# Patient Record
Sex: MALE | Race: WHITE | NOT HISPANIC OR LATINO | Employment: OTHER | URBAN - METROPOLITAN AREA
[De-identification: names, ages, dates, MRNs, and addresses within clinical notes are randomized per-mention and may not be internally consistent; named-entity substitution may affect disease eponyms.]

---

## 2017-05-25 ENCOUNTER — TELEPHONE (OUTPATIENT)
Dept: NEUROLOGY | Facility: CLINIC | Age: 82
End: 2017-05-25

## 2017-05-25 NOTE — TELEPHONE ENCOUNTER
----- Message from Wilbert Keith sent at 5/23/2017  3:19 PM CDT -----  Contact: pt's wife Randa  Pt is calling to see if pt's referral was recvd for pt yet  Call Back#[phone]  Thanks

## 2017-05-25 NOTE — TELEPHONE ENCOUNTER
Appointment scheduled to see Dr. Alvarado 8-15-17, mailed and wife, Rebecca, indicated understanding.

## 2017-08-15 ENCOUNTER — LAB VISIT (OUTPATIENT)
Dept: LAB | Facility: HOSPITAL | Age: 82
End: 2017-08-15
Attending: PSYCHIATRY & NEUROLOGY
Payer: MEDICARE

## 2017-08-15 ENCOUNTER — OFFICE VISIT (OUTPATIENT)
Dept: NEUROLOGY | Facility: CLINIC | Age: 82
End: 2017-08-15
Payer: MEDICARE

## 2017-08-15 VITALS
BODY MASS INDEX: 20.8 KG/M2 | HEIGHT: 68 IN | WEIGHT: 137.25 LBS | HEART RATE: 50 BPM | DIASTOLIC BLOOD PRESSURE: 72 MMHG | SYSTOLIC BLOOD PRESSURE: 145 MMHG

## 2017-08-15 DIAGNOSIS — G47.52 REM SLEEP BEHAVIOR DISORDER: ICD-10-CM

## 2017-08-15 DIAGNOSIS — R29.6 FALLS FREQUENTLY: ICD-10-CM

## 2017-08-15 DIAGNOSIS — R41.3 MEMORY LOSS: ICD-10-CM

## 2017-08-15 DIAGNOSIS — R41.3 MEMORY LOSS: Primary | ICD-10-CM

## 2017-08-15 LAB
TSH SERPL DL<=0.005 MIU/L-ACNC: 2.24 UIU/ML
VIT B12 SERPL-MCNC: 536 PG/ML

## 2017-08-15 PROCEDURE — 99205 OFFICE O/P NEW HI 60 MIN: CPT | Mod: S$GLB,,, | Performed by: PSYCHIATRY & NEUROLOGY

## 2017-08-15 PROCEDURE — 1126F AMNT PAIN NOTED NONE PRSNT: CPT | Mod: S$GLB,,, | Performed by: PSYCHIATRY & NEUROLOGY

## 2017-08-15 PROCEDURE — 1159F MED LIST DOCD IN RCRD: CPT | Mod: S$GLB,,, | Performed by: PSYCHIATRY & NEUROLOGY

## 2017-08-15 PROCEDURE — 82607 VITAMIN B-12: CPT

## 2017-08-15 PROCEDURE — 99999 PR PBB SHADOW E&M-EST. PATIENT-LVL III: CPT | Mod: PBBFAC,,, | Performed by: PSYCHIATRY & NEUROLOGY

## 2017-08-15 PROCEDURE — 3008F BODY MASS INDEX DOCD: CPT | Mod: S$GLB,,, | Performed by: PSYCHIATRY & NEUROLOGY

## 2017-08-15 PROCEDURE — 84443 ASSAY THYROID STIM HORMONE: CPT

## 2017-08-15 PROCEDURE — 36415 COLL VENOUS BLD VENIPUNCTURE: CPT

## 2017-08-15 RX ORDER — ESCITALOPRAM OXALATE 10 MG/1
10 TABLET ORAL DAILY
COMMUNITY
End: 2019-09-10 | Stop reason: SDUPTHER

## 2017-08-15 RX ORDER — DONEPEZIL HYDROCHLORIDE 10 MG/1
10 TABLET, FILM COATED ORAL NIGHTLY
Qty: 90 TABLET | Refills: 3 | Status: SHIPPED | OUTPATIENT
Start: 2017-08-15 | End: 2018-10-31 | Stop reason: SDUPTHER

## 2017-08-15 RX ORDER — LOVASTATIN 40 MG/1
40 TABLET ORAL NIGHTLY
COMMUNITY
End: 2019-09-10 | Stop reason: SDUPTHER

## 2017-08-15 RX ORDER — DONEPEZIL HYDROCHLORIDE 5 MG/1
5 TABLET, FILM COATED ORAL NIGHTLY
COMMUNITY
End: 2017-08-15 | Stop reason: SDUPTHER

## 2017-08-15 RX ORDER — MULTIVITAMIN
1 TABLET ORAL DAILY
Status: ON HOLD | COMMUNITY
End: 2021-08-23 | Stop reason: HOSPADM

## 2017-08-15 RX ORDER — ASPIRIN 325 MG
650 TABLET ORAL DAILY
Status: ON HOLD | COMMUNITY
End: 2021-08-23 | Stop reason: HOSPADM

## 2017-08-15 NOTE — LETTER
August 15, 2017      Tyrell Daniel MD  [address]  [address]  [facility] [place] [address]           [facility]  [address] [facility] [address] [place] [address]  Phone: [phone]  Fax: [phone]          Patient: Bruno Manjarrez   MR Number: [id number]   YOB: 1934   Date of Visit: 8/15/2017       Dear Dr. Tyrell Daniel:    Thank you for referring Bruno Manjarrez to me for evaluation. Attached you will find relevant portions of my assessment and plan of care.    If you have questions, please do not hesitate to call me. I look forward to following Bruno Manjarrez along with you.    Sincerely,    Benny Alvarado, DO    Enclosure  CC:  No Recipients    If you would like to receive this communication electronically, please contact [email or web address] or [phone] to request more information on [facility] Link access.    For providers and/or their staff who would like to refer a patient to [facility], please contact us through our one-stop-shop provider referral line, [facility], at [phone].    If you feel you have received this communication in error or would no longer like to receive these types of communications, please e-mail [email or web address]

## 2017-08-15 NOTE — PROGRESS NOTES
Subjective:      8/15/2017       Patient ID: Bruno Manjarrez is a right handed 82 y.o.  male who presents for memory and tremor.    History of Present Illness    Mr. Manjarrez is a very pleasant 82-year-old gentleman here with his wife.  Here to discuss tremor, active dreaming and nightmares, and memory loss.  Has been calling out and acting out his dreams over the past 20 years (actively fights with his brother for example) and has been sleeping in a different room for a long time, however this has become more pronounced recently.    He himself is aware that he has difficulty with memory but has limited insight and can not give details.  His wife states he mat for example go to the wrong pharmacy to  medicine, go shopping and forget groceries in the car.  Difficulty completing tasks he starts, may not complete a sentence.  Misplacing objects.     Although he was an , his wife took over the finances years ago primarily since he retired, not because she had to but my preference.  Recently however he stopped driving.     Falls: Has not passed out but has had to sit down after standing up, has had multiple falls from feeling dizzy.  Last fall 2 weeks ago.     Onset of memory issues in 2008, started getting lost in familiar places more recently, perhaps over past 2-3 years.      May have some visual hallucinations, has seen the dog whom they put to sleep from time to time in the house recently.  His wife was unaware this was happening.  He may have some other formed visual hallucinations but has not told anyone about them.    Review of patient's allergies indicates:  No Known Allergies  Current Outpatient Prescriptions   Medication Sig Dispense Refill    aspirin (ECOTRIN) 81 MG EC tablet Take 81 mg by mouth once daily.      donepezil (ARICEPT) 10 MG tablet Take 1 tablet (10 mg total) by mouth every evening. 90 tablet 3    escitalopram oxalate (LEXAPRO) 10 MG tablet Take 10 mg by mouth once daily.       lovastatin (MEVACOR) 40 MG tablet Take 40 mg by mouth every evening.      multivitamin (ONE DAILY MULTIVITAMIN) per tablet Take 1 tablet by mouth once daily.      polycarbophil (FIBERCON) 625 mg tablet Take 625 mg by mouth 4 (four) times daily as needed.      VIT C/E/ZN/COPPR/LUTEIN/ZEAXAN (PRESERVISION AREDS 2 ORAL) Take by mouth.       No current facility-administered medications for this visit.        Past Medical History  History reviewed. No pertinent past medical history.    Past Surgical History  History reviewed. No pertinent surgical history.    Family History  History reviewed. No pertinent family history.    Social History  Social History     Social History    Marital status:      Spouse name: N/A    Number of children: N/A    Years of education: N/A     Occupational History    Not on file.     Social History Main Topics    Smoking status: Never Smoker    Smokeless tobacco: Never Used    Alcohol use Not on file    Drug use: Unknown    Sexual activity: Not on file     Other Topics Concern    Not on file     Social History Narrative    No narrative on file        Review of Systems  Review of Systems   Constitutional: Positive for appetite change, fatigue and unexpected weight change (loss). Negative for fever.   HENT: Negative for congestion, hearing loss, nosebleeds, sinus pressure and trouble swallowing.    Eyes: Negative for pain and visual disturbance.   Respiratory: Negative for cough, shortness of breath and wheezing.    Cardiovascular: Negative for chest pain and palpitations.   Gastrointestinal: Negative for abdominal pain, blood in stool, diarrhea, nausea and vomiting.   Endocrine: Negative for cold intolerance and heat intolerance.   Genitourinary: Negative for difficulty urinating, hematuria and urgency.   Musculoskeletal: Negative for arthralgias, back pain, gait problem, myalgias and neck pain.   Skin: Negative for rash and wound.   Neurological: Positive for tremors and  "weakness. Negative for dizziness, seizures and numbness.   Hematological: Bruises/bleeds easily.   Psychiatric/Behavioral: Positive for dysphoric mood (depression). Negative for decreased concentration and sleep disturbance. The patient is nervous/anxious.        Objective:        Vitals:    08/15/17 0800   BP: (!) 145/72   Pulse: (!) 50     Body mass index is 20.87 kg/m².  Neurologic Exam     Mental Status   Oriented to person, place, and time.   Registration: recalls 3 of 3 objects. Recall at 5 minutes: recalls 2 of 3 objects.   [id number]==96, corrected to 80  WORLD-DLROW    Difficulty in comprehending written instruction (close your eyes), missed intersecting pentagons    Unable to draw clock - made a [place],  placed numbers in counter clockwise order (12,11,10, etc moving clockwise), circled 7,1,5 when asked to place hands at "7:15"    MMSE score 27       Cranial Nerves     CN II   Visual fields full to confrontation.     CN III, IV, VI   Pupils are equal, round, and reactive to light.  Extraocular motions are normal.   CN III: no CN III palsy  CN VI: no CN VI palsy    CN V   Facial sensation intact.     CN VII   Facial expression full, symmetric.     CN IX, X   Palate: symmetric    CN XI   CN XI normal.     CN XII   CN XII normal.   Full facial strength but decreased expressivity     Motor Exam   Muscle bulk: normal  Overall muscle tone: normal  Right arm pronator drift: absent  Left arm pronator drift: absent    Strength   Strength 5/5 throughout. bradykinesia     Sensory Exam   Light touch normal.     Gait, Coordination, and Reflexes     Gait  Gait: normal    Coordination   Romberg: negative  Finger to nose coordination: normal    Tremor   Resting tremor: present (diffuse tremulousness)    Reflexes   Right brachioradialis: 2+  Left brachioradialis: 2+  Right biceps: 3+  Left biceps: 3+  Right triceps: 2+  Left triceps: 2+  Right patellar: 3+  Left patellar: 2+  Right achilles: 2+  Left achilles: 2+Fine " tremulousness throughout symmetrically, in UE, face neck and trunk, primarily at rest.  No pill rolling.       Physical Exam   Constitutional: He is oriented to person, place, and time. He appears well-developed and well-nourished.   HENT:   Head: Normocephalic and atraumatic.   Eyes: EOM are normal. Pupils are equal, round, and reactive to light.   Cardiovascular: Normal rate and regular rhythm.    Pulmonary/Chest: Effort normal and breath sounds normal.   Neurological: He is oriented to person, place, and time. He has normal strength. He has a normal Finger-Nose-Finger Test and a normal Romberg Test. Gait normal.   Reflex Scores:       Tricep reflexes are 2+ on the right side and 2+ on the left side.       Bicep reflexes are 3+ on the right side and 3+ on the left side.       Brachioradialis reflexes are 2+ on the right side and 2+ on the left side.       Patellar reflexes are 3+ on the right side and 2+ on the left side.       Achilles reflexes are 2+ on the right side and 2+ on the left side.  Psychiatric: His mood appears anxious (mildly). His speech is delayed. He is slowed. He is not agitated.   Vitals reviewed.        Data Review:     No lab studies available.      MRI brain on disc [facility] reviewed. There is mild atrophy, maybe appropriate for age, and scattered mild chronic small vessel ischemic changes in the subcortical white matter        Assessment:       1. Memory loss    2. Falls frequently    3. REM sleep behavior disorder        Plan:         Problem List Items Addressed This Visit        Neuro    Memory loss - Primary    Current Assessment & Plan     Clinical features of memory loss and forgetfulness, visual spatial deficits on examination, and history of falls, visual hallucinations and REM sleep disordered behavior concerning for Lewy body dementia.  Explained to the patient and his wife that I can't confirm that diagnosis now, but would like to get detailed neuropsychological testing for  clinical correlation.    At this point time he is not driving.  Discussed falls precautions.  We will set up physical therapy for assisting with his gait stability.    Increase Aricept from 5-10 mg daily.         Relevant Orders    Vitamin B12    TSH    Ambulatory Referral to Neuropsychology       Pulmonary    REM sleep behavior disorder    Current Assessment & Plan     I will have to do some research to find out what may be beneficial.  Consider dedicated referral to sleep disorders clinic as this seems to precede his current diagnosis by many years            Other    Falls frequently    Current Assessment & Plan     As above         Relevant Orders    Ambulatory Referral to Physical/Occupational Therapy      Other Visit Diagnoses    None.         Return in about 2 months (around 10/15/2017).        Patient information shared at the time of visit:      Thank you very much for the opportunity to assist in this patient's care.  If you have any questions or concerns, please do not hesitate to contact me at any time.     Sincerely,  Benny Alvarado, DO

## 2017-08-15 NOTE — ASSESSMENT & PLAN NOTE
Clinical features of memory loss and forgetfulness, visual spatial deficits on examination, and history of falls, visual hallucinations and REM sleep disordered behavior concerning for Lewy body dementia.  Explained to the patient and his wife that I can't confirm that diagnosis now, but would like to get detailed neuropsychological testing for clinical correlation.    At this point time he is not driving.  Discussed falls precautions.  We will set up physical therapy for assisting with his gait stability.    Increase Aricept from 5-10 mg daily.

## 2017-08-15 NOTE — ASSESSMENT & PLAN NOTE
I will have to do some research to find out what may be beneficial.  Consider dedicated referral to sleep disorders clinic as this seems to precede his current diagnosis by many years

## 2017-08-17 ENCOUNTER — TELEPHONE (OUTPATIENT)
Dept: NEUROLOGY | Facility: CLINIC | Age: 82
End: 2017-08-17

## 2017-08-17 NOTE — TELEPHONE ENCOUNTER
----- Message from Margarita Wallace sent at 8/17/2017 12:58 PM CDT -----  Contact: Su from [facility]  Calling as needs clarification on Rx Donepezil    [facility] [address] [address] [place]  [address] [facility]  [address] [facility]  [place] [address]  Phone: [phone] Fax: [phone]

## 2017-08-17 NOTE — TELEPHONE ENCOUNTER
Spoke with the p[harmacy, verified the dose of Aricept was increased by Dr. Alvarado at most recent visit.

## 2017-08-18 ENCOUNTER — TELEPHONE (OUTPATIENT)
Dept: NEUROLOGY | Facility: CLINIC | Age: 82
End: 2017-08-18

## 2017-08-18 NOTE — TELEPHONE ENCOUNTER
Spoke to pt, informed that we have his disk from [facility] that needs to be returned.  States he does not come to [place] often and he will pick it up at his next appt in November.  Disk and envelope labeled with pt label.

## 2017-08-25 ENCOUNTER — CLINICAL SUPPORT (OUTPATIENT)
Dept: REHABILITATION | Facility: HOSPITAL | Age: 82
End: 2017-08-25
Attending: PSYCHIATRY & NEUROLOGY
Payer: MEDICARE

## 2017-08-25 DIAGNOSIS — R29.6 FALLS FREQUENTLY: Primary | ICD-10-CM

## 2017-08-25 PROCEDURE — G8978 MOBILITY CURRENT STATUS: HCPCS | Mod: CJ,PN

## 2017-08-25 PROCEDURE — 97162 PT EVAL MOD COMPLEX 30 MIN: CPT | Mod: PN

## 2017-08-25 PROCEDURE — G8979 MOBILITY GOAL STATUS: HCPCS | Mod: CI,PN

## 2017-08-25 NOTE — PLAN OF CARE
TIME RECORD    Date: 08/25/2017    Start Time:  1110  Stop Time:  1200    PROCEDURES:    TIMED  Procedure Time Min.    Start:  Stop:     Start:  Stop:     Start:  Stop:     Start:  Stop:          UNTIMED  Procedure Time Min.     Initial Evaluation Start:  1110  Stop:  1200   47    Start:  Stop:      Total Timed Minutes:  0  Total Timed Units:  0  Total Untimed Units:  1  Charges Billed/# of units:  1    OUTPATIENT PHYSICAL THERAPY   PATIENT EVALUATION    Onset Date: Chronic  Primary Diagnosis:   1. Falls frequently       Treatment Diagnosis: Balance disturbance  No past medical history on file.  Precautions: Fall risk  Prior Therapy: Following a hand surgery.    Medications: Bruno Manjarrez has a current medication list which includes the following prescription(s): aspirin, donepezil, escitalopram oxalate, lovastatin, multivitamin, polycarbophil, and vit c/e/zn/coppr/lutein/zeaxan.  Nutrition:  Normal  History of Present Illness: Pt presents to PT with history of falls.  He reports that this problems has been occurring for awhile.  Pt states that he averages ~ 2 falls a month.  It is episodic.  He reports intermittent dizziness but he states that the dizziness has not been associated with falling.  Falls occur with quick movements, changes in direction, etc.  He states that he can usually feel the dizziness coming on and can hold onto something or sit down until it passes (usually within a few minutes).  He has been diagnosed with macular degeneration which affects his vision that could contribute to tripping hazard.  Reports muscle weakness, particularly in his shoulders and knees.  States that sometimes it feels like his knees are giving out on him resulting in a fall.  He attends the gym 2-3 per week and walks on the treadmill @ 3.0mph x 1.5 miles. According to his EMR, pt has history of memory loss.     Prior Level of Function: Independent  Social History: Lives with his wife.  He does drive; however, his wife  "has been taking over driving.  He stopped cutting his grass.  He goes to the grocery store with his wife and helps load/unload groceries.    Place of Residence (Steps/Adaptations): 2 story home.  Bedroom is on 2nd floor.    Functional Deficits Leading to Referral/Nature of Injury: Increased fall risk due to visual impairment, weakness, and dizziness.    Patient Therapy Goals: To get rid of the dizziness.      Subjective     Bruno Manjarrez states "The dry eyes is aggravating.  It's hard to focus."    Pain:  Reports "a few pains in my head" but non descriptive.      Objective     Posture: Standing: pelvis level, R shoulder elevated with minimal winging, moderate forward head posture.  Sitting Reversed lumbar lordosis, increased thoracic kyphosis, moderate rounded shoulder and forward head posture.    Palpation: N/A  Sensation: No deficits reported.    DTRs:  Not tested this date.   Range of Motion/Strength: Trunk flexion 50%, ext 25%, SB 50% B, rotation 40% B  LE ROM grossly WFL with end range tightness into hip external rotation.    Strength: hips 4-/5, knees 4/5, ankles 4-/5  Flexibility: Hamstring length R 132*, L 128*.  Moderate gastroc tightness, minimal to moderate piriformis tightness; moderate R hip flexor tightness. Min to moderate L hip flexor tightness.    Gait: Without AD  Analysis: Increased step width, decreased foot clearance.    Bed Mobility:Independent  Transfers: Independent  Increased UE support.    Special Tests: Occular ROM WNL without nystagmus, Head thrust (-) for nystagmus, head shake (-) for nystagmus.    Pt does report intermittent dizziness with bending forward particularly with prolonged forward bend.  Unable to reproduce in the clinic.    Other: Crowder Balance Scale:  41/56  G-code mobility current CJ, goal mobility CI.  Timed 5x sit<>stand 11 sec  Treatment: Educated pt in role of PT and proposed POC.  Verbalized understanding and agreement.      Assessment       Initial Assessment " (Pertinent finding, problem list and factors affecting outcome): Pt presents to PT with history of falls averaging ~ 2 falls/month.  Problems include reported dizziness, impaired posture, decreased trunk ROM, decreased LE flexibility, decreased LE weakness, and impaired balance.  These problems contribute to impaired functional mobility and gait.  He should benefit from skilled PT services to facilitate his safe participation in his usual activities.    Rehab Potiential: fair  Per EMR pt has memory loss.  Pt also demonstrated difficulty with following multiple step instructions during evaluation.      Short Term Goals (3 Weeks): 1) Facilitate improved posture in sitting and standing, 2) Facilitate improved LE flexibility, 3) Pt will perform sit<>stand transfers safely without use of UE 6 of 8 trials  Long Term Goals (6 Weeks): 1) Pt will ambulate on uneven surfaces demonstrating consistent foot clearance 75% of the time, 2) Pt will safely negotiate 2 flights of steps with minimal use of UE, 3) Improve Timed 5x sit<>stand to < 10s, 4) Improve Crowder Balance score to > 50/56, 5) Pt will be independent in HEP    Plan     Certification Period: 08/25/2017 to 10/6/2017  Recommended Treatment Plan: 2 times per week for 6 weeks: Gait Training, Group Therapy, Patient Education, Therapeutic Activites, Therapeutic Exercise and Other Balance/coordination activities.    Other Recommendations: N/A      Therapist: Anastacia Lau, PT    I CERTIFY THE NEED FOR THESE SERVICES FURNISHED UNDER THIS PLAN OF TREATMENT AND WHILE UNDER MY CARE    Physician's comments: ________________________________________________________________________________________________________________________________________________      Physician's Name: ___________________________________

## 2017-08-28 ENCOUNTER — CLINICAL SUPPORT (OUTPATIENT)
Dept: REHABILITATION | Facility: HOSPITAL | Age: 82
End: 2017-08-28
Attending: PSYCHIATRY & NEUROLOGY
Payer: MEDICARE

## 2017-08-28 DIAGNOSIS — R29.6 FALLS FREQUENTLY: ICD-10-CM

## 2017-08-28 PROCEDURE — 97110 THERAPEUTIC EXERCISES: CPT | Mod: PN

## 2017-08-28 NOTE — PROGRESS NOTES
"Name: Bruno Manjarrez  Clinic Number: [id number]  Date of Treatment: 08/28/2017   Diagnosis:   Encounter Diagnosis   Name Primary?    Falls frequently        Time in: 1105  Time Out: 1200  Total Treatment Time: 20    Subjective:    Bruno reports no pain. States he forgot to mention during PT eval that he has moments of "fog" in his eyes when he Is getting out of the car on a hot day and walks jail to his mailbox. Fog lifts when he sits for a couple of minutes. States it is not fog on his glasses. Discussed with Anastacia PT.    Objective:    BP before session 166/58, HR 52, O2 sats 98%-cleared per Anastacia PT to begin therapy with monitoring  BP after 5 min on Bike 192/84, HR 53  BP after seated rest 178/80, HR 51    Patient received individual therapy to increase strength, endurance, ROM and flexibility with activities as follows:     Bruno received therapeutic exercises to develop strength, endurance, ROM and flexibility for 20 minutes including:     Bike x 5'  Gait training x 2 trials through clinic and in parking lot over unlevel surfaces and over cement bumpers SBA with cues for obstacle negotiation_NOTE that pt has LOB with self-correction during sit-stand while attempting to turn to R during transfer.   Discontinued session 2* elevated BP and instructed pt to contact Dr. Daniel today for further instruction. Pt returned to clinic, stating his wife asked him to cancel his appt Friday until he sees MD for BP issues.     Continue HEP daily.      Pt demo good understanding of the education provided. Bruno demonstrated good return demonstration of activities.     Assessment:     Session limited 2* BP issues. Balance limited as montioned above.     Pt will continue to benefit from skilled PT intervention. Medical Necessity is demonstrated by:  Requires skilled supervision to complete and progress HEP and Weakness.    Plan:    Continue with established Plan of Care towards PT goals. Resume when appropriate.   "

## 2017-09-18 ENCOUNTER — CLINICAL SUPPORT (OUTPATIENT)
Dept: REHABILITATION | Facility: HOSPITAL | Age: 82
End: 2017-09-18
Attending: PSYCHIATRY & NEUROLOGY
Payer: MEDICARE

## 2017-09-18 DIAGNOSIS — R29.6 FALLS FREQUENTLY: ICD-10-CM

## 2017-09-18 PROCEDURE — 97110 THERAPEUTIC EXERCISES: CPT | Mod: PN

## 2017-09-18 NOTE — PROGRESS NOTES
"Name: Bruno Manjarrez  Clinic Number: [id number]  Date of Treatment: 09/18/2017   Diagnosis:   Encounter Diagnosis   Name Primary?    Falls frequently        Time in: 1003  Time Out: 1051  Total Treatment Time: 48      Subjective:    Bruno reports no complaints.  Patient reports their pain to be 0/10 on a 0-10 scale with 0 being no pain and 10 being the worst pain imaginable.    Objective    Patient received individual therapy to increase strength, flexibility, posture and balance with activities as follows:     Bruno received therapeutic exercises to develop strength, flexibility, posture and balance for 48 minutes including:     Nustep level 2 x 4 minutes, pt c/o R knee pain, exercise stopped at this time  Standing gastroc stretch 3 x 30 sec B  Standing hip abd 3 x 10 B  HR/TR x 30 B  Hamstring stretch 3 x 30 sec B  Clamshell GTB x 30  LAQ 2# x 30 B  Ball squeeze x 30  SLR 3 x 10 B  Bridges x 30      Written Home Exercises Provided: cont HEP  Pt demo good understanding of the education provided. Bruno demonstrated fair return demonstration of activities.     Assessment:     Pt reported legs felt "tight" upon completion of exercises.  Tight hamstrings B.  Pt asked if he can go to the gym and walk on TM.  Suggested pt to use bike or Nustep, as he can sit on these machines due to prior falls.    Pt will continue to benefit from skilled PT intervention. Medical Necessity is demonstrated by:  Fall Risk, Unable to participate fully in daily activities, Requires skilled supervision to complete and progress HEP and Weakness.    Patient is making fair progress towards established goals.      Plan:  Continue with established Plan of Care towards PT goals.   "

## 2017-09-21 ENCOUNTER — CLINICAL SUPPORT (OUTPATIENT)
Dept: REHABILITATION | Facility: HOSPITAL | Age: 82
End: 2017-09-21
Attending: PSYCHIATRY & NEUROLOGY
Payer: MEDICARE

## 2017-09-21 DIAGNOSIS — R29.6 FALLS FREQUENTLY: ICD-10-CM

## 2017-09-21 PROCEDURE — 97110 THERAPEUTIC EXERCISES: CPT | Mod: PN

## 2017-09-21 NOTE — PROGRESS NOTES
"Name: Bruno Manjarrez  Clinic Number: [id number]  Date of Treatment: 09/21/2017   Diagnosis:   Encounter Diagnosis   Name Primary?    Falls frequently        Time in: 1104  Time Out: 1257  Total Treatment Time: 53        Subjective:    Bruno reports knees hurt when going up and down stairs at home and when using the Nu-step for very long.  Patient reports their pain to be 0/10 on a 0-10 scale with 0 being no pain and 10 being the worst pain imaginable.  Patient states he went to the doctor and that his doctor stated his blood pressure was fine.    Objective    Bruno received therapeutic exercises to develop strength, endurance, ROM, flexibility and core stabilization for 53 minutes including:     Vitals: 161/80 HR 58    NuStep L-1 x 10min  Gastroc stretch 3/30"  HR/TR x 30  Hip abd in // bars 2#  3/10 B  TA standing x 30  Bridge x 30  SLR 3/10 B  LAQ 2#  3/10 B  Ball squeeze x 30  Clamshells with GTB x 30  Braiding 10' in //  SLS on AIREX x 3 min R/L for balance  Dead Bug 3/10      Written Home Exercises Provided: continue with current  Pt demo good understanding of the education provided. Bruno demonstrated fair return demonstration of activities.     Assessment:       Pt will continue to benefit from skilled PT intervention. Medical Necessity is demonstrated by:  Pain limits function of effected part for some activities, Unable to participate fully in daily activities, Requires skilled supervision to complete and progress HEP and Weakness.    Patient is making fair progress towards established goals.      Plan:  Continue with established Plan of Care towards PT goals.   "

## 2017-09-25 ENCOUNTER — CLINICAL SUPPORT (OUTPATIENT)
Dept: REHABILITATION | Facility: HOSPITAL | Age: 82
End: 2017-09-25
Attending: PSYCHIATRY & NEUROLOGY
Payer: MEDICARE

## 2017-09-25 DIAGNOSIS — R29.6 FALLS FREQUENTLY: ICD-10-CM

## 2017-09-25 PROCEDURE — 97110 THERAPEUTIC EXERCISES: CPT | Mod: PN

## 2017-09-25 NOTE — PROGRESS NOTES
"Name: Bruno Manjarrez  [facility] Number: [id number]  Date of Treatment: 09/25/2017   Diagnosis:   Encounter Diagnosis   Name Primary?    Falls frequently        Time in: 1003  Time Out: 1100  Total Treatment Time: 57      Subjective:    Bruno reports improvement of symptoms and feels like he is doing better.  Patient reports their pain to be 0/10 on a 0-10 scale with 0 being no pain and 10 being the worst pain imaginable.    Objective    Bruno received therapeutic exercises to develop strength, endurance, flexibility, posture and core stabilization for 57 minutes including:     NuStep L-1 x 10min  Gastroc stretch over 1/2 roll  3/30"  HHS sitting 3/30"  HR/TR x 30  Hip abd in // bars 2#  3/10 B  TA standing x 30  Bridge x 30  SLR 3/10 B  LAQ 2#  3/10 B  Ball squeeze x 30  Clamshells with GTB x 30  Dead Bug 3/11  Shuttle #50 B x 30  DF-100 22# flexion/extension x 30 ea      Written Home Exercises Provided: continue with current  Pt demo good understanding of the education provided. Bruno demonstrated fair return demonstration of activities.     Assessment:     Patient tolerated adding shuttle and DF-100 well today, and was able to perform dead bug with less verbal cue for coordination issues.  Pt will continue to benefit from skilled PT intervention. Medical Necessity is demonstrated by:  Fall Risk, Unable to participate fully in daily activities, Requires skilled supervision to complete and progress HEP and Weakness.    Patient is making fair progress towards established goals.    Plan:  Continue with established Plan of Care towards PT goals.   "

## 2017-09-29 ENCOUNTER — CLINICAL SUPPORT (OUTPATIENT)
Dept: REHABILITATION | Facility: HOSPITAL | Age: 82
End: 2017-09-29
Attending: PSYCHIATRY & NEUROLOGY
Payer: MEDICARE

## 2017-09-29 DIAGNOSIS — R29.6 FALLS FREQUENTLY: Primary | ICD-10-CM

## 2017-09-29 PROCEDURE — 97110 THERAPEUTIC EXERCISES: CPT | Mod: PN

## 2017-09-29 NOTE — PROGRESS NOTES
"Name: Bruno Manjarrez  [facility] Number: [id number]  Date of Treatment: 09/29/2017   Diagnosis:   Encounter Diagnosis   Name Primary?    Falls frequently Yes       Time in: 1102  Time Out: 1200  Total Treatment Time: 58    Subjective:    Bruno reports improvement of symptoms.  Patient reports their pain to be 0/10 on a 0-10 scale with 0 being no pain and 10 being the worst pain imaginable. Patient states he still feel a little shaky when walking and not sure of himself. Afraid all the time of falling when not at home in his house.    Objective    Bruno received therapeutic exercises to develop strength, endurance, flexibility, posture and core stabilization for 58 minutes including:    Vitals: 184/82 HR 55 at rest    NuStep L-1 x 10min  Gastroc stretch over 1/2 roll  3/30"  HHS sitting 3/30"  HR/TR x 30  Hip abd in // bars 2#  3/10 B  TA sitting x 30  Bridge x 30  SLR 4-way 3/10 B  Ball squeeze x 30  Clamshells side lying with GTB x 30  Shuttle #50 B x 30  DF-100 22# flexion/extension x 30 ea        Written Home Exercises Provided: continue with current  Pt demo fair understanding of the education provided. Bruno demonstrated fair return demonstration of activities.     Assessment:       Pt will continue to benefit from skilled PT intervention. Medical Necessity is demonstrated by:  Fall Risk, Unable to participate fully in daily activities, Requires skilled supervision to complete and progress HEP and Weakness.    Patient is making fair progress towards established goals.      Plan:  Continue with established Plan of Care towards PT goals.   "

## 2018-11-04 RX ORDER — DONEPEZIL HYDROCHLORIDE 10 MG/1
10 TABLET, FILM COATED ORAL NIGHTLY
Qty: 90 TABLET | Refills: 3 | Status: SHIPPED | OUTPATIENT
Start: 2018-11-04 | End: 2019-09-10 | Stop reason: SDUPTHER

## 2018-11-13 ENCOUNTER — TELEPHONE (OUTPATIENT)
Dept: NEUROLOGY | Facility: CLINIC | Age: 83
End: 2018-11-13

## 2018-11-13 NOTE — TELEPHONE ENCOUNTER
----- Message from Emilia Patrick sent at 11/12/2018  3:55 PM CST -----  Contact: Rebecca-wife [phone]  Type: Needs Medical Advice    Who Called:  Rebecca-spouse  Best Call Back Number: [phone]  Additional Information: patient was referred to dr cuadra by artie hernandez. Patient needs to be seen for a neurology testing for parkinson would like a call back today please. Thanks

## 2018-11-13 NOTE — TELEPHONE ENCOUNTER
Patient's wife notified that I do not have the referral.  Given the correct fax number to have the referral sent to. Will call to schedule once I have received the referral.

## 2018-11-15 ENCOUNTER — TELEPHONE (OUTPATIENT)
Dept: NEUROLOGY | Facility: CLINIC | Age: 83
End: 2018-11-15

## 2018-11-15 NOTE — TELEPHONE ENCOUNTER
Left message for the patient to call.  Informed we have not received a referral and that Dr. Hawkins only sees patient in [place].

## 2018-11-15 NOTE — TELEPHONE ENCOUNTER
----- Message from Rojelio Price sent at 11/15/2018 12:44 PM CST -----  Contact: Pt wife - ke   States she's calling to see if the referral from Dr Daniel's office has been received on pt and can be reached at [phone]

## 2019-09-10 ENCOUNTER — OFFICE VISIT (OUTPATIENT)
Dept: FAMILY MEDICINE | Facility: CLINIC | Age: 84
End: 2019-09-10
Payer: MEDICARE

## 2019-09-10 VITALS
SYSTOLIC BLOOD PRESSURE: 120 MMHG | WEIGHT: 130 LBS | HEART RATE: 64 BPM | DIASTOLIC BLOOD PRESSURE: 80 MMHG | HEIGHT: 67 IN | BODY MASS INDEX: 20.4 KG/M2

## 2019-09-10 DIAGNOSIS — L57.0 ACTINIC KERATOSIS: ICD-10-CM

## 2019-09-10 DIAGNOSIS — F32.A DEPRESSION, UNSPECIFIED DEPRESSION TYPE: ICD-10-CM

## 2019-09-10 DIAGNOSIS — N18.30 CHRONIC KIDNEY DISEASE, STAGE 3: ICD-10-CM

## 2019-09-10 DIAGNOSIS — E78.5 HYPERLIPIDEMIA, UNSPECIFIED HYPERLIPIDEMIA TYPE: ICD-10-CM

## 2019-09-10 DIAGNOSIS — I10 HYPERTENSION, UNSPECIFIED TYPE: ICD-10-CM

## 2019-09-10 DIAGNOSIS — F03.90 DEMENTIA WITHOUT BEHAVIORAL DISTURBANCE, UNSPECIFIED DEMENTIA TYPE: ICD-10-CM

## 2019-09-10 DIAGNOSIS — G20.A1 PARKINSON'S DISEASE: Primary | ICD-10-CM

## 2019-09-10 PROCEDURE — 99214 OFFICE O/P EST MOD 30 MIN: CPT | Mod: S$GLB,,, | Performed by: FAMILY MEDICINE

## 2019-09-10 PROCEDURE — 99214 PR OFFICE/OUTPT VISIT, EST, LEVL IV, 30-39 MIN: ICD-10-PCS | Mod: S$GLB,,, | Performed by: FAMILY MEDICINE

## 2019-09-10 PROCEDURE — 1101F PR PT FALLS ASSESS DOC 0-1 FALLS W/OUT INJ PAST YR: ICD-10-PCS | Mod: S$GLB,,, | Performed by: FAMILY MEDICINE

## 2019-09-10 PROCEDURE — 1101F PT FALLS ASSESS-DOCD LE1/YR: CPT | Mod: S$GLB,,, | Performed by: FAMILY MEDICINE

## 2019-09-10 RX ORDER — CARBIDOPA AND LEVODOPA 25; 100 MG/1; MG/1
1 TABLET ORAL 3 TIMES DAILY
Qty: 270 TABLET | Refills: 1 | Status: SHIPPED | OUTPATIENT
Start: 2019-09-10 | End: 2019-12-09

## 2019-09-10 RX ORDER — AMLODIPINE BESYLATE 5 MG/1
5 TABLET ORAL DAILY
Qty: 90 TABLET | Refills: 1 | Status: SHIPPED | OUTPATIENT
Start: 2019-09-10 | End: 2020-03-06 | Stop reason: SDUPTHER

## 2019-09-10 RX ORDER — LOVASTATIN 40 MG/1
40 TABLET ORAL NIGHTLY
Qty: 90 TABLET | Refills: 1 | Status: SHIPPED | OUTPATIENT
Start: 2019-09-10 | End: 2020-03-11 | Stop reason: SDUPTHER

## 2019-09-10 RX ORDER — CARBIDOPA AND LEVODOPA 10; 100 MG/1; MG/1
1 TABLET ORAL 3 TIMES DAILY
Qty: 270 TABLET | Refills: 1 | Status: CANCELLED | OUTPATIENT
Start: 2019-09-10

## 2019-09-10 RX ORDER — AMLODIPINE BESYLATE 5 MG/1
1 TABLET ORAL DAILY
COMMUNITY
Start: 2019-08-23 | End: 2019-09-10 | Stop reason: SDUPTHER

## 2019-09-10 RX ORDER — ESCITALOPRAM OXALATE 10 MG/1
10 TABLET ORAL DAILY
Qty: 90 TABLET | Refills: 1 | Status: SHIPPED | OUTPATIENT
Start: 2019-09-10 | End: 2020-03-11 | Stop reason: SDUPTHER

## 2019-09-10 RX ORDER — DONEPEZIL HYDROCHLORIDE 10 MG/1
10 TABLET, FILM COATED ORAL NIGHTLY
Qty: 90 TABLET | Refills: 3 | Status: SHIPPED | OUTPATIENT
Start: 2019-09-10 | End: 2020-08-18 | Stop reason: SDUPTHER

## 2019-09-10 RX ORDER — CARBIDOPA AND LEVODOPA 10; 100 MG/1; MG/1
25-100 TABLET ORAL 3 TIMES DAILY
COMMUNITY
Start: 2018-10-04 | End: 2020-03-11

## 2019-09-10 NOTE — PATIENT INSTRUCTIONS
For Caregivers: Daily Care for Dementia Patients     Gardening can be a pleasant way to keep your loved one active.   Over time, people with dementia will need more and more help with daily tasks. These include eating meals, taking medicines, and getting enough exercise. They also include personal care needs, such as bathing and dressing. To reduce stress, make these activities part of a routine. Ask family and friends to lend a hand. And be aware that your loved ones abilities can change from day to day. If you have problems meeting your loved ones needs, its time to get help. Talk to a  or local support agency--such as a local Alzheimers Association chapter.   Activity and exercise  Regular activity is good for your loved ones body and mind. It may even help slow the progression of the disease. Keep to your loved ones old routines when possible. It also helps to:  · Do things together. Go for a walk, garden, or bake a cake. Basic, repetitive activities are good choices.  · Be active as often as possible. This releases pent-up energy, which can reduce restlessness and improve sleep.  · Include social activities. Take your loved one to see friends and family. But try to keep things simple. Loud noises, crowds, or too many people talking at once can be upsetting.  Taking medicines  Be sure all prescribed medicines are taken as directed. These tips can help:  · Provide supervision if your loved one cannot safely take medicines alone.  · Set a routine so medicines are taken at the same time each day.  · Ensure that ALL medicines are taken. A pillbox can help you keep track.  · Plan ahead. Be sure to refill prescriptions before they run out.  Eating meals  At mealtime, serve healthy foods with plenty of fluids. These tips can also help:  · Keep meals simple. Too many choices can be overwhelming. Try to maintain a calm, quiet atmosphere while you eat.  · Place healthy snacks, such as fresh fruit, out  where they can be seen.  · Watch eating habits. People with dementia may eat too little or too much. Talk to the healthcare provider if you have concerns.  · Try finger foods if regular meals become too difficult for your loved one to eat.  Dressing  People with dementia may have trouble choosing what to wear. Its OK if clothes dont always match. But if help is needed:  · Choose clothing that is easy to put on and take off. Use Velcro shoes or slippers.  · Lay out a fresh outfit each day. Place clothes in the order they should be put on.  · If more help is needed, hand over clothing items one at a time. Explain how each item should be put on.  · Put dirty clothes away so theyre not worn again.  Bathing and grooming  Getting your loved one to bathe can be a real challenge. Try these tips:  · Treat bathing as a routine activity. But be flexible. A daily bath is probably unrealistic.  · Prepare bath items ahead of time, and be sure to test the water temperature.  · Avoid leaving your loved one alone in the bath or shower.             · Try visiting a barbershop or [place] salon for help with hair washing, hair styling, and shaving.  Using the toilet  In later stages, dementia patients may develop incontinence (trouble controlling the bladder or bowel). To ease problems:  · Set a routine for using the toilet (for example, every 3 hours) and stick to it.  · Limit beverages before bedtime to prevent accidents. A bedside commode may also help.  · Be understanding if accidents happen. Your loved one may be as upset as you.  · At one point it may be necessary to have them wear an adult diaper.    · Talk to a healthcare provider if incontinence develops suddenly. It may signal other health issues that can be treated.  When to call the healthcare provider  Call the healthcare provider if you notice a sudden change in your loved ones behavior or emotions. These changes may be due to dementia. But they could also signal other  health problems that can be treated.   Date Last Reviewed: 10/2/2015  NOTE:This sheet is a summary. It may not cover all possible information. If you have questions about this medicine, talk to your doctor, pharmacist, or health care provider. Copyright© 2017 [employer]

## 2019-09-10 NOTE — PROGRESS NOTES
SUBJECTIVE:    Patient ID: Bruno Manjarrez is a 84 y.o. male.    Chief Complaint: Follow-up    Has Parkinson's disease , no recent falls, I seem more tired  And  Sleepy, he doesn't remember how to do his  Chores ,Saw Dr Lock for neurobehavioral eval... Recommended increasing the  Dose of  Simemet to 25/100 tid,, has  Dementia ,goes to gym occas  And  Walks on treadmill 25  Min,      No visits with results within 6 Month(s) from this visit.   Latest known visit with results is:   Lab Visit on 08/15/2017   Component Date Value Ref Range Status    Vitamin B-12 08/15/2017 536  210 - 950 pg/mL Final    TSH 08/15/2017 2.240  0.400 - 4.000 uIU/mL Final       Past Medical History:   Diagnosis Date    Cataract     Dementia     Depression      Past Surgical History:   Procedure Laterality Date    APPENDECTOMY      EYE SURGERY       Family History   Problem Relation Age of Onset    Depression Mother        Marital Status:   Alcohol History:  has no alcohol history on file.  Tobacco History:  reports that he has never smoked. He has never used smokeless tobacco.  Drug History:  has no drug history on file.    Review of patient's allergies indicates:  No Known Allergies    Current Outpatient Medications:     carbidopa-levodopa  mg (SINEMET)  mg per tablet, Take  mg by mouth 3 (three) times daily., Disp: , Rfl:     amLODIPine (NORVASC) 5 MG tablet, Take 1 tablet (5 mg total) by mouth once daily., Disp: 90 tablet, Rfl: 1    aspirin (ECOTRIN) 81 MG EC tablet, Take 81 mg by mouth once daily., Disp: , Rfl:     carbidopa-levodopa  mg (SINEMET)  mg per tablet, Take 1 tablet by mouth 3 (three) times daily., Disp: 270 tablet, Rfl: 1    donepezil (ARICEPT) 10 MG tablet, Take 1 tablet (10 mg total) by mouth every evening., Disp: 90 tablet, Rfl: 3    escitalopram oxalate (LEXAPRO) 10 MG tablet, Take 1 tablet (10 mg total) by mouth once daily., Disp: 90 tablet, Rfl: 1    lovastatin  "(MEVACOR) 40 MG tablet, Take 1 tablet (40 mg total) by mouth every evening., Disp: 90 tablet, Rfl: 1    multivitamin (ONE DAILY MULTIVITAMIN) per tablet, Take 1 tablet by mouth once daily., Disp: , Rfl:     Review of Systems   Constitutional: Negative for appetite change, chills, fatigue, fever and unexpected weight change.   HENT: Negative for congestion, ear pain and trouble swallowing.    Eyes: Negative for pain, discharge and visual disturbance.   Respiratory: Negative for apnea, cough, choking, shortness of breath and wheezing.    Cardiovascular: Negative for chest pain and leg swelling.   Gastrointestinal: Negative for abdominal pain, blood in stool, constipation, diarrhea, nausea and vomiting.   Endocrine: Negative for cold intolerance, heat intolerance and polydipsia.   Genitourinary: Positive for frequency (nocturia 2-3x nite). Negative for dysuria, hematuria, testicular pain and urgency.   Musculoskeletal: Negative for gait problem, joint swelling and myalgias.   Neurological: Negative for dizziness, seizures and numbness.   Psychiatric/Behavioral: Negative for agitation, behavioral problems and hallucinations. The patient is not nervous/anxious.           Objective:      Vitals:    09/10/19 1134   BP: 120/80   Pulse: 64   Weight: 59 kg (130 lb)   Height: 5' 7" (1.702 m)     Physical Exam   Constitutional: He is oriented to person, place, and time. He appears well-developed and well-nourished.   Slightly anxious white male with flat affect   HENT:   Head: Normocephalic and atraumatic.   Right Ear: External ear normal.   Left Ear: External ear normal.   Nose: Nose normal.   Mouth/Throat: Oropharynx is clear and moist.   Eyes: Pupils are equal, round, and reactive to light. EOM are normal.   Neck: Normal range of motion. Neck supple. Carotid bruit is not present. No thyromegaly present.   Cardiovascular: Normal rate, regular rhythm, normal heart sounds and intact distal pulses.   No murmur " heard.  Pulmonary/Chest: Effort normal and breath sounds normal. He has no wheezes. He has no rales.   Abdominal: Soft. Bowel sounds are normal. He exhibits no distension. There is no hepatosplenomegaly. There is no tenderness.   Musculoskeletal: Normal range of motion. He exhibits no tenderness or deformity.        Lumbar back: Normal. He exhibits no pain and no spasm.   Bends 90 degrees at  waist   Lymphadenopathy:     He has no cervical adenopathy.   Neurological: He is alert and oriented to person, place, and time. No cranial nerve deficit. Coordination normal.   Gait seems rather normal.  He does not have any shuffling.  He does not have any overt tremor at rest.  He has a mild intention tremor he does swing his arms with his gait.  He has poor short-term memory and is unable to do any executive functions.   Skin: Skin is warm and dry. No rash noted.   Actinic keratosis to the dorsum of the left hand is noted   Psychiatric: He has a normal mood and affect. His behavior is normal. Judgment and thought content normal.   Nursing note and vitals reviewed.        Assessment:       1. Parkinson's disease    2. Hyperlipidemia, unspecified hyperlipidemia type    3. Depression, unspecified depression type    4. Dementia without behavioral disturbance, unspecified dementia type    5. Hypertension, unspecified type    6. Chronic kidney disease, stage 3    7. Actinic keratosis         Plan:       Parkinson's disease  -     carbidopa-levodopa  mg (SINEMET)  mg per tablet; Take 1 tablet by mouth 3 (three) times daily.  Dispense: 270 tablet; Refill: 1  We will increase carbidopa it to 25 mg t.i.d. for better Parkinson's control.  Hyperlipidemia, unspecified hyperlipidemia type  -     lovastatin (MEVACOR) 40 MG tablet; Take 1 tablet (40 mg total) by mouth every evening.  Dispense: 90 tablet; Refill: 1  -     Comprehensive metabolic panel; Future; Expected date: 09/10/2019  -     Lipid panel; Future; Expected date:  09/10/2019  Labs due today.  Depression, unspecified depression type  -     escitalopram oxalate (LEXAPRO) 10 MG tablet; Take 1 tablet (10 mg total) by mouth once daily.  Dispense: 90 tablet; Refill: 1  Continuous escitalopram  Dementia without behavioral disturbance, unspecified dementia type  -     donepezil (ARICEPT) 10 MG tablet; Take 1 tablet (10 mg total) by mouth every evening.  Dispense: 90 tablet; Refill: 3  Patient is unable to do many executive functions.  But he does go to the gym and stay active around the house.  Hypertension, unspecified type  -     amLODIPine (NORVASC) 5 MG tablet; Take 1 tablet (5 mg total) by mouth once daily.  Dispense: 90 tablet; Refill: 1  -     CBC auto differential; Future; Expected date: 09/10/2019  Patient rarely uses amlodipine.  Only if systolic is greater than 140  Chronic kidney disease, stage 3  GFR was in 30 30-50 range.  We will repeat the CMP today  Actinic keratosis  Left dorsal hand has a small AKA.  We will re-evaluate him in 3-6 months perhaps needs cryotherapy    Follow up in about 6 months (around 3/10/2020) for check up w/ Gricelda.

## 2019-09-11 LAB
ALBUMIN SERPL-MCNC: 4.5 G/DL (ref 3.6–5.1)
ALBUMIN/GLOB SERPL: 1.8 (CALC) (ref 1–2.5)
ALP SERPL-CCNC: 73 U/L (ref 40–115)
ALT SERPL-CCNC: 18 U/L (ref 9–46)
AST SERPL-CCNC: 24 U/L (ref 10–35)
BASOPHILS # BLD AUTO: 40 CELLS/UL (ref 0–200)
BASOPHILS NFR BLD AUTO: 0.6 %
BILIRUB SERPL-MCNC: 0.7 MG/DL (ref 0.2–1.2)
BUN SERPL-MCNC: 29 MG/DL (ref 7–25)
BUN/CREAT SERPL: 19 (CALC) (ref 6–22)
CALCIUM SERPL-MCNC: 10 MG/DL (ref 8.6–10.3)
CHLORIDE SERPL-SCNC: 103 MMOL/L (ref 98–110)
CHOLEST SERPL-MCNC: 183 MG/DL
CHOLEST/HDLC SERPL: 3 (CALC)
CO2 SERPL-SCNC: 30 MMOL/L (ref 20–32)
CREAT SERPL-MCNC: 1.52 MG/DL (ref 0.7–1.11)
EOSINOPHIL # BLD AUTO: 119 CELLS/UL (ref 15–500)
EOSINOPHIL NFR BLD AUTO: 1.8 %
ERYTHROCYTE [DISTWIDTH] IN BLOOD BY AUTOMATED COUNT: 11.8 % (ref 11–15)
GFRSERPLBLD MDRD-ARVRAT: 41 ML/MIN/1.73M2
GLOBULIN SER CALC-MCNC: 2.5 G/DL (CALC) (ref 1.9–3.7)
GLUCOSE SERPL-MCNC: 85 MG/DL (ref 65–139)
HCT VFR BLD AUTO: 43.2 % (ref 38.5–50)
HDLC SERPL-MCNC: 62 MG/DL
HGB BLD-MCNC: 14.6 G/DL (ref 13.2–17.1)
LDLC SERPL CALC-MCNC: 97 MG/DL (CALC)
LYMPHOCYTES # BLD AUTO: 931 CELLS/UL (ref 850–3900)
LYMPHOCYTES NFR BLD AUTO: 14.1 %
MCH RBC QN AUTO: 32 PG (ref 27–33)
MCHC RBC AUTO-ENTMCNC: 33.8 G/DL (ref 32–36)
MCV RBC AUTO: 94.7 FL (ref 80–100)
MONOCYTES # BLD AUTO: 469 CELLS/UL (ref 200–950)
MONOCYTES NFR BLD AUTO: 7.1 %
NEUTROPHILS # BLD AUTO: 5042 CELLS/UL (ref 1500–7800)
NEUTROPHILS NFR BLD AUTO: 76.4 %
NONHDLC SERPL-MCNC: 121 MG/DL (CALC)
PLATELET # BLD AUTO: 195 THOUSAND/UL (ref 140–400)
PMV BLD REES-ECKER: 11.1 FL (ref 7.5–12.5)
POTASSIUM SERPL-SCNC: 5.2 MMOL/L (ref 3.5–5.3)
PROT SERPL-MCNC: 7 G/DL (ref 6.1–8.1)
RBC # BLD AUTO: 4.56 MILLION/UL (ref 4.2–5.8)
SODIUM SERPL-SCNC: 142 MMOL/L (ref 135–146)
TRIGL SERPL-MCNC: 139 MG/DL
WBC # BLD AUTO: 6.6 THOUSAND/UL (ref 3.8–10.8)

## 2019-09-16 ENCOUNTER — TELEPHONE (OUTPATIENT)
Dept: FAMILY MEDICINE | Facility: CLINIC | Age: 84
End: 2019-09-16

## 2019-09-16 NOTE — PROGRESS NOTES
Spoke to patients wife, Rebecca(ok on hipaa) that per Dr Daniel: his kidney function slightly declined as he is not drinking enough water, increase the  Water intake,chol great 183.liver is normal.  Repeat bmp in march. Verbalized understanding. Remind me created for March. Aware we will call when lab work is due.

## 2019-09-16 NOTE — TELEPHONE ENCOUNTER
----- Message from Tyrell Daniel MD sent at 9/14/2019  3:47 PM CDT -----  Call his  Wife, his kidney function slightly declined as he is not drinking enough water, increase the  Water intake,chol great 183.liver is normal.  Repeat bmp in march

## 2019-09-24 ENCOUNTER — TELEPHONE (OUTPATIENT)
Dept: FAMILY MEDICINE | Facility: CLINIC | Age: 84
End: 2019-09-24

## 2019-09-24 NOTE — TELEPHONE ENCOUNTER
----- Message from Lottie Mtz sent at 9/24/2019  2:59 PM CDT -----  vm- pt has huge cyst on his elbow the size of the plum . Left elbow it has gotten bigger and wants someone to look at it . Please give him a call

## 2019-10-01 ENCOUNTER — OFFICE VISIT (OUTPATIENT)
Dept: ORTHOPEDICS | Facility: CLINIC | Age: 84
End: 2019-10-01
Payer: MEDICARE

## 2019-10-01 VITALS
DIASTOLIC BLOOD PRESSURE: 64 MMHG | BODY MASS INDEX: 20.4 KG/M2 | SYSTOLIC BLOOD PRESSURE: 124 MMHG | HEART RATE: 60 BPM | WEIGHT: 130 LBS | HEIGHT: 67 IN

## 2019-10-01 DIAGNOSIS — M70.21 OLECRANON BURSITIS OF RIGHT ELBOW: Primary | ICD-10-CM

## 2019-10-01 PROCEDURE — 99203 OFFICE O/P NEW LOW 30 MIN: CPT | Mod: 25,S$GLB,, | Performed by: ORTHOPAEDIC SURGERY

## 2019-10-01 PROCEDURE — 20605 DRAIN/INJ JOINT/BURSA W/O US: CPT | Mod: RT,S$GLB,, | Performed by: ORTHOPAEDIC SURGERY

## 2019-10-01 PROCEDURE — 1101F PR PT FALLS ASSESS DOC 0-1 FALLS W/OUT INJ PAST YR: ICD-10-PCS | Mod: S$GLB,,, | Performed by: ORTHOPAEDIC SURGERY

## 2019-10-01 PROCEDURE — 1101F PT FALLS ASSESS-DOCD LE1/YR: CPT | Mod: S$GLB,,, | Performed by: ORTHOPAEDIC SURGERY

## 2019-10-01 PROCEDURE — 99203 PR OFFICE/OUTPT VISIT, NEW, LEVL III, 30-44 MIN: ICD-10-PCS | Mod: 25,S$GLB,, | Performed by: ORTHOPAEDIC SURGERY

## 2019-10-01 PROCEDURE — 20605: ICD-10-PCS | Mod: RT,S$GLB,, | Performed by: ORTHOPAEDIC SURGERY

## 2019-10-01 NOTE — PROGRESS NOTES
[facility]  Patient comes in today for the right elbow. He's developed fluid over the olecranon bursa. He did have a fall about 2 weeks ago.  Subjective:     Chief Complaint:   Chief Complaint   Patient presents with    Right Elbow - Pain     Right elbow pain/lump x 2 weeks. States that he has a lump on his elbow and is not painful. Does bother him when he leans on the elbow.        Past Medical History:   Diagnosis Date    Cataract     Dementia     Depression        Past Surgical History:   Procedure Laterality Date    APPENDECTOMY      EYE SURGERY         Current Outpatient Medications   Medication Sig    amLODIPine (NORVASC) 5 MG tablet Take 1 tablet (5 mg total) by mouth once daily.    aspirin (ECOTRIN) 81 MG EC tablet Take 81 mg by mouth once daily.    carbidopa-levodopa  mg (SINEMET)  mg per tablet Take 1 tablet by mouth 3 (three) times daily.    donepezil (ARICEPT) 10 MG tablet Take 1 tablet (10 mg total) by mouth every evening.    escitalopram oxalate (LEXAPRO) 10 MG tablet Take 1 tablet (10 mg total) by mouth once daily.    lovastatin (MEVACOR) 40 MG tablet Take 1 tablet (40 mg total) by mouth every evening.    multivitamin (ONE DAILY MULTIVITAMIN) per tablet Take 1 tablet by mouth once daily.    carbidopa-levodopa  mg (SINEMET)  mg per tablet Take  mg by mouth 3 (three) times daily.     No current facility-administered medications for this visit.        Review of patient's allergies indicates:  No Known Allergies    Family History   Problem Relation Age of Onset    Depression Mother        Social History     Socioeconomic History    Marital status:      Spouse name: Not on file    Number of children: Not on file    Years of education: Not on file    Highest education level: Not on file   Occupational History    Not on file   Social Needs    Financial resource strain: Not on file    Food insecurity:     Worry: Not on file     Inability:  Not on file    Transportation needs:     Medical: Not on file     Non-medical: Not on file   Tobacco Use    Smoking status: Never Smoker    Smokeless tobacco: Never Used   Substance and Sexual Activity    Alcohol use: Not on file    Drug use: Not on file    Sexual activity: Not on file   Lifestyle    Physical activity:     Days per week: Not on file     Minutes per session: Not on file    Stress: Not on file   Relationships    Social connections:     Talks on phone: Not on file     Gets together: Not on file     Attends Yazdanism service: Not on file     Active member of club or organization: Not on file     Attends meetings of clubs or organizations: Not on file     Relationship status: Not on file   Other Topics Concern    Not on file   Social History Narrative    Not on file       History of present illness: Patient comes in today for the right elbow      Review of Systems:    Constitution: Negative for chills, fever, and sweats.  Negative for unexplained weight loss.    HENT:  Negative for headaches and blurry vision.    Cardiovascular:Negative for chest pain or irregular heart beat. Negative for hypertension.    Respiratory:  Negative for cough and shortness of breath.    Gastrointestinal: Negative for abdominal pain, heartburn, melena, nausea, and vomitting.    Genitourinary:  Negative bladder incontinence and dysuria.    Musculoskeletal:  See HPI for details.     Neurological: Negative for numbness.    Psychiatric/Behavioral: Negative for depression.  The patient is not nervous/anxious.      Endocrine: Negative for polyuria    Hematologic/Lymphatic: Negative for bleeding problem.  Does not bruise/bleed easily.    Skin: Negative for poor would healing and rash    Objective:      Physical Examination:    Vital Signs:    Vitals:    10/01/19 1331   BP: 124/64   Pulse: 60       Body mass index is 20.36 kg/m².    This a well-developed, well nourished patient in no acute distress.  They are alert and  oriented and cooperative to examination.        Patient has full range of motion the right elbow. He has a collection of fluid which I aspirated. 10 cc of bloody fluid. No pain with palpation or with motion.  Pertinent New Results:    XRAY Report / Interpretation:   AP and lateral of the right elbow demonstrates normal bony anatomy no fractures or subluxations    Assessment/Plan:      Right elbow olecranon bursitis traumatic. I aspirated the right olecranon bursa. He will follow-up if the fluid returns      This note was created using Dragon voice recognition software that occasionally misinterpreted phrases or words.

## 2019-10-01 NOTE — PROCEDURES
Elbow Bursa Drainage: R elbow  Date/Time: 10/1/2019 1:00 PM  Performed by: James Argueta MD  Authorized by: James Argueta MD     Consent Done?:  Yes (Verbal)  Timeout: prior to procedure the correct patient, procedure, and site was verified    Indications:  Pain  Site marked: the procedure site was marked    Timeout: prior to procedure the correct patient, procedure, and site was verified    Location:  Elbow  Site:  R elbow  Prep: patient was prepped and draped in usual sterile fashion    Needle size:  25 G  Aspirate amount (ml):  10  Aspirate:  Blood-tinged  Patient tolerance:  Patient tolerated the procedure well with no immediate complications   NO MED ADMINISTERED, ONLY ASPIRATION

## 2019-10-08 ENCOUNTER — OFFICE VISIT (OUTPATIENT)
Dept: ORTHOPEDICS | Facility: CLINIC | Age: 84
End: 2019-10-08
Payer: MEDICARE

## 2019-10-08 VITALS
SYSTOLIC BLOOD PRESSURE: 118 MMHG | WEIGHT: 130 LBS | HEIGHT: 67 IN | BODY MASS INDEX: 20.4 KG/M2 | HEART RATE: 70 BPM | DIASTOLIC BLOOD PRESSURE: 72 MMHG

## 2019-10-08 DIAGNOSIS — M70.21 OLECRANON BURSITIS OF RIGHT ELBOW: Primary | ICD-10-CM

## 2019-10-08 PROCEDURE — 99212 OFFICE O/P EST SF 10 MIN: CPT | Mod: 25,S$GLB,, | Performed by: ORTHOPAEDIC SURGERY

## 2019-10-08 PROCEDURE — 1101F PT FALLS ASSESS-DOCD LE1/YR: CPT | Mod: S$GLB,,, | Performed by: ORTHOPAEDIC SURGERY

## 2019-10-08 PROCEDURE — 20605 DRAIN/INJ JOINT/BURSA W/O US: CPT | Mod: RT,S$GLB,, | Performed by: ORTHOPAEDIC SURGERY

## 2019-10-08 PROCEDURE — 20605: ICD-10-PCS | Mod: RT,S$GLB,, | Performed by: ORTHOPAEDIC SURGERY

## 2019-10-08 PROCEDURE — 99212 PR OFFICE/OUTPT VISIT, EST, LEVL II, 10-19 MIN: ICD-10-PCS | Mod: 25,S$GLB,, | Performed by: ORTHOPAEDIC SURGERY

## 2019-10-08 PROCEDURE — 1101F PR PT FALLS ASSESS DOC 0-1 FALLS W/OUT INJ PAST YR: ICD-10-PCS | Mod: S$GLB,,, | Performed by: ORTHOPAEDIC SURGERY

## 2019-10-08 NOTE — PROGRESS NOTES
[facility]    Subjective:     Chief Complaint:   Chief Complaint   Patient presents with    Right Elbow - Swelling     Increased swelling right elbow. Patient had bursitis drained last week.       Past Medical History:   Diagnosis Date    Cataract     Dementia     Depression        Past Surgical History:   Procedure Laterality Date    APPENDECTOMY      EYE SURGERY         Current Outpatient Medications   Medication Sig    amLODIPine (NORVASC) 5 MG tablet Take 1 tablet (5 mg total) by mouth once daily.    aspirin (ECOTRIN) 81 MG EC tablet Take 81 mg by mouth once daily.    carbidopa-levodopa  mg (SINEMET)  mg per tablet Take 1 tablet by mouth 3 (three) times daily.    donepezil (ARICEPT) 10 MG tablet Take 1 tablet (10 mg total) by mouth every evening.    escitalopram oxalate (LEXAPRO) 10 MG tablet Take 1 tablet (10 mg total) by mouth once daily.    lovastatin (MEVACOR) 40 MG tablet Take 1 tablet (40 mg total) by mouth every evening.    multivitamin (ONE DAILY MULTIVITAMIN) per tablet Take 1 tablet by mouth once daily.    carbidopa-levodopa  mg (SINEMET)  mg per tablet Take  mg by mouth 3 (three) times daily.     No current facility-administered medications for this visit.        Review of patient's allergies indicates:  No Known Allergies    Family History   Problem Relation Age of Onset    Depression Mother        Social History     Socioeconomic History    Marital status:      Spouse name: Not on file    Number of children: Not on file    Years of education: Not on file    Highest education level: Not on file   Occupational History    Not on file   Social Needs    Financial resource strain: Not on file    Food insecurity:     Worry: Not on file     Inability: Not on file    Transportation needs:     Medical: Not on file     Non-medical: Not on file   Tobacco Use    Smoking status: Never Smoker    Smokeless tobacco: Never Used   Substance and  Sexual Activity    Alcohol use: Not on file    Drug use: Not on file    Sexual activity: Not on file   Lifestyle    Physical activity:     Days per week: Not on file     Minutes per session: Not on file    Stress: Not on file   Relationships    Social connections:     Talks on phone: Not on file     Gets together: Not on file     Attends Yazdanism service: Not on file     Active member of club or organization: Not on file     Attends meetings of clubs or organizations: Not on file     Relationship status: Not on file   Other Topics Concern    Not on file   Social History Narrative    Not on file       History of present illness: Patient comes in today for the right elbow. Unfortunately the olecranon bursa has refilled with fluid.      Review of Systems:    Constitution: Negative for chills, fever, and sweats.  Negative for unexplained weight loss.    HENT:  Negative for headaches and blurry vision.    Cardiovascular:Negative for chest pain or irregular heart beat. Negative for hypertension.    Respiratory:  Negative for cough and shortness of breath.    Gastrointestinal: Negative for abdominal pain, heartburn, melena, nausea, and vomitting.    Genitourinary:  Negative bladder incontinence and dysuria.    Musculoskeletal:  See HPI for details.     Neurological: Negative for numbness.    Psychiatric/Behavioral: Negative for depression.  The patient is not nervous/anxious.      Endocrine: Negative for polyuria    Hematologic/Lymphatic: Negative for bleeding problem.  Does not bruise/bleed easily.    Skin: Negative for poor would healing and rash    Objective:      Physical Examination:    Vital Signs:    Vitals:    10/08/19 1519   BP: 118/72   Pulse: 70       Body mass index is 20.36 kg/m².    This a well-developed, well nourished patient in no acute distress.  They are alert and oriented and cooperative to examination.        Patient is full range of motion the elbow. No erythema. He does have fluid  bursa.  Pertinent New Results:        Assessment/Plan:      Olecranon bursitis. I aspirated the olecranon bursa. 8 cc of clear fluid. Follow-up if the fluid returns      This note was created using Dragon voice recognition software that occasionally misinterpreted phrases or words.

## 2019-10-08 NOTE — PROCEDURES
RT elbow drainage: R elbow  Date/Time: 10/8/2019 3:00 PM  Performed by: James Argueta MD  Authorized by: James Argueta MD     Consent Done?:  Yes (Verbal)  Timeout: prior to procedure the correct patient, procedure, and site was verified    Site marked: the procedure site was marked    Timeout: prior to procedure the correct patient, procedure, and site was verified    Location:  Elbow  Site:  R elbow  Prep: patient was prepped and draped in usual sterile fashion    Needle size:  25 G  Aspirate amount (ml):  10  Aspirate:  Blood-tinged  Patient tolerance:  Patient tolerated the procedure well with no immediate complications   NO MED ADMINISTERED, ONLY ASPIRATION

## 2019-12-12 ENCOUNTER — TELEPHONE (OUTPATIENT)
Dept: FAMILY MEDICINE | Facility: CLINIC | Age: 84
End: 2019-12-12

## 2019-12-12 DIAGNOSIS — G20.A1 PARKINSON'S DISEASE: Primary | ICD-10-CM

## 2019-12-12 DIAGNOSIS — R29.6 FALLS FREQUENTLY: ICD-10-CM

## 2019-12-12 DIAGNOSIS — F03.90 DEMENTIA WITHOUT BEHAVIORAL DISTURBANCE, UNSPECIFIED DEMENTIA TYPE: ICD-10-CM

## 2019-12-12 NOTE — TELEPHONE ENCOUNTER
Per Ms. Angela, pt needs a new order for a hospital bed. The one from   because the pt refused it.

## 2020-03-03 ENCOUNTER — TELEPHONE (OUTPATIENT)
Dept: FAMILY MEDICINE | Facility: CLINIC | Age: 85
End: 2020-03-03

## 2020-03-03 DIAGNOSIS — I10 HYPERTENSION, UNSPECIFIED TYPE: Primary | ICD-10-CM

## 2020-03-03 DIAGNOSIS — N18.30 CHRONIC KIDNEY DISEASE, STAGE 3: ICD-10-CM

## 2020-03-03 NOTE — TELEPHONE ENCOUNTER
Spoke to patient's wife, Rebecca, that fasting lab is due. I pended BMP order, but he has ov with you 3/11. Do you want to order anymore labs for his visit? If so, please add. I informed wife that you may need more labs for his visit.

## 2020-03-03 NOTE — TELEPHONE ENCOUNTER
----- Message from [facility] sent at 9/16/2019  3:05 PM CDT -----  Regarding: Lab Order  Call his  Wife, his kidney function slightly declined as he is not drinking enough water, increase the  Water intake,chol great 183.liver is normal.  Repeat bmp in march

## 2020-03-04 ENCOUNTER — TELEPHONE (OUTPATIENT)
Dept: FAMILY MEDICINE | Facility: CLINIC | Age: 85
End: 2020-03-04

## 2020-03-04 NOTE — TELEPHONE ENCOUNTER
I actually figured out that those were duplicate orders from September and all patient needs is what was drawn today from yesterday's order.

## 2020-03-04 NOTE — TELEPHONE ENCOUNTER
From overdue results-lab due. I spoke to wife yesterday that Gricelda may add more labs, spoke to her today and said they had lab drawn this morning. Checking with [facility] to see if they guadalupe both sets

## 2020-03-05 LAB
BUN SERPL-MCNC: 32 MG/DL (ref 7–25)
BUN/CREAT SERPL: 23 (CALC) (ref 6–22)
CALCIUM SERPL-MCNC: 9.6 MG/DL (ref 8.6–10.3)
CHLORIDE SERPL-SCNC: 105 MMOL/L (ref 98–110)
CO2 SERPL-SCNC: 31 MMOL/L (ref 20–32)
CREAT SERPL-MCNC: 1.4 MG/DL (ref 0.7–1.11)
GFRSERPLBLD MDRD-ARVRAT: 45 ML/MIN/1.73M2
GLUCOSE SERPL-MCNC: 96 MG/DL (ref 65–99)
POTASSIUM SERPL-SCNC: 4.4 MMOL/L (ref 3.5–5.3)
PSA SERPL-MCNC: 1.8 NG/ML
SODIUM SERPL-SCNC: 145 MMOL/L (ref 135–146)
TSH SERPL-ACNC: 2.42 MIU/L (ref 0.4–4.5)

## 2020-03-06 DIAGNOSIS — I10 HYPERTENSION, UNSPECIFIED TYPE: ICD-10-CM

## 2020-03-06 RX ORDER — AMLODIPINE BESYLATE 5 MG/1
5 TABLET ORAL DAILY
Qty: 90 TABLET | Refills: 1 | Status: SHIPPED | OUTPATIENT
Start: 2020-03-06 | End: 2020-03-11 | Stop reason: SDUPTHER

## 2020-03-06 NOTE — TELEPHONE ENCOUNTER
----- Message from Torey Maier sent at 3/6/2020  9:10 AM CST -----  Contact: Rebecca pt's wife  Needs refill on Amlodipine 5 MG  Send to [facility]   Pt# [phone]

## 2020-03-11 ENCOUNTER — OFFICE VISIT (OUTPATIENT)
Dept: FAMILY MEDICINE | Facility: CLINIC | Age: 85
End: 2020-03-11
Payer: MEDICARE

## 2020-03-11 VITALS
OXYGEN SATURATION: 99 % | SYSTOLIC BLOOD PRESSURE: 120 MMHG | HEART RATE: 56 BPM | BODY MASS INDEX: 21.03 KG/M2 | WEIGHT: 134 LBS | DIASTOLIC BLOOD PRESSURE: 72 MMHG | HEIGHT: 67 IN

## 2020-03-11 DIAGNOSIS — N18.30 CKD (CHRONIC KIDNEY DISEASE) STAGE 3, GFR 30-59 ML/MIN: ICD-10-CM

## 2020-03-11 DIAGNOSIS — E78.2 MIXED HYPERLIPIDEMIA: ICD-10-CM

## 2020-03-11 DIAGNOSIS — F32.A MILD DEPRESSION: ICD-10-CM

## 2020-03-11 DIAGNOSIS — G20.A1 PARKINSON'S DISEASE: Primary | ICD-10-CM

## 2020-03-11 DIAGNOSIS — I10 ESSENTIAL HYPERTENSION: ICD-10-CM

## 2020-03-11 PROCEDURE — 1101F PR PT FALLS ASSESS DOC 0-1 FALLS W/OUT INJ PAST YR: ICD-10-PCS | Mod: S$GLB,,, | Performed by: NURSE PRACTITIONER

## 2020-03-11 PROCEDURE — 3078F PR MOST RECENT DIASTOLIC BLOOD PRESSURE < 80 MM HG: ICD-10-PCS | Mod: S$GLB,,, | Performed by: NURSE PRACTITIONER

## 2020-03-11 PROCEDURE — 99214 PR OFFICE/OUTPT VISIT, EST, LEVL IV, 30-39 MIN: ICD-10-PCS | Mod: S$GLB,,, | Performed by: NURSE PRACTITIONER

## 2020-03-11 PROCEDURE — 99214 OFFICE O/P EST MOD 30 MIN: CPT | Mod: S$GLB,,, | Performed by: NURSE PRACTITIONER

## 2020-03-11 PROCEDURE — 3074F PR MOST RECENT SYSTOLIC BLOOD PRESSURE < 130 MM HG: ICD-10-PCS | Mod: S$GLB,,, | Performed by: NURSE PRACTITIONER

## 2020-03-11 PROCEDURE — 3074F SYST BP LT 130 MM HG: CPT | Mod: S$GLB,,, | Performed by: NURSE PRACTITIONER

## 2020-03-11 PROCEDURE — 3078F DIAST BP <80 MM HG: CPT | Mod: S$GLB,,, | Performed by: NURSE PRACTITIONER

## 2020-03-11 PROCEDURE — 1101F PT FALLS ASSESS-DOCD LE1/YR: CPT | Mod: S$GLB,,, | Performed by: NURSE PRACTITIONER

## 2020-03-11 PROCEDURE — 1159F MED LIST DOCD IN RCRD: CPT | Mod: S$GLB,,, | Performed by: NURSE PRACTITIONER

## 2020-03-11 PROCEDURE — 1159F PR MEDICATION LIST DOCUMENTED IN MEDICAL RECORD: ICD-10-PCS | Mod: S$GLB,,, | Performed by: NURSE PRACTITIONER

## 2020-03-11 RX ORDER — DOXYCYCLINE 100 MG/1
CAPSULE ORAL
COMMUNITY
Start: 2020-02-17 | End: 2020-09-09

## 2020-03-11 RX ORDER — CARBIDOPA AND LEVODOPA 25; 100 MG/1; MG/1
1 TABLET ORAL 3 TIMES DAILY
Qty: 270 TABLET | Refills: 1 | Status: SHIPPED | OUTPATIENT
Start: 2020-03-11 | End: 2020-09-09 | Stop reason: SDUPTHER

## 2020-03-11 RX ORDER — ESCITALOPRAM OXALATE 10 MG/1
10 TABLET ORAL DAILY
Qty: 90 TABLET | Refills: 1 | Status: SHIPPED | OUTPATIENT
Start: 2020-03-11 | End: 2020-09-09 | Stop reason: SDUPTHER

## 2020-03-11 RX ORDER — CARBIDOPA AND LEVODOPA 25; 100 MG/1; MG/1
1 TABLET ORAL 3 TIMES DAILY
COMMUNITY
Start: 2019-12-09 | End: 2020-03-11 | Stop reason: SDUPTHER

## 2020-03-11 RX ORDER — AMLODIPINE BESYLATE 5 MG/1
5 TABLET ORAL DAILY
Qty: 90 TABLET | Refills: 1 | Status: ON HOLD | OUTPATIENT
Start: 2020-03-11 | End: 2021-08-23 | Stop reason: HOSPADM

## 2020-03-11 RX ORDER — PERPHENAZINE/AMITRIPTYLINE HCL 4 MG-25 MG
1 TABLET ORAL DAILY
COMMUNITY
End: 2021-06-08

## 2020-03-11 RX ORDER — LOVASTATIN 40 MG/1
40 TABLET ORAL NIGHTLY
Qty: 90 TABLET | Refills: 1 | Status: SHIPPED | OUTPATIENT
Start: 2020-03-11 | End: 2020-09-09 | Stop reason: SDUPTHER

## 2020-03-11 NOTE — PROGRESS NOTES
SUBJECTIVE:    Patient ID: Bruno Manjarrez is a 85 y.o. male.    Chief Complaint: Follow-up (refills, brought bottles// SW)    Pt here for regular f/u- here with his wife. They report overall he's been doing okay. The increase in sinemet at last visit seemed to help with tremors and gait- had his first fall in a quite a while on Sunday- got up fast to go to the bathroom and lost his balance. Fell over on right side, struck right side of head on fiberglass bathtub. No LOC, denies headaches or dizziness since fall  Brings in BP log from home and BP ranging 140-160's mainly however was well controlled at his wellness visit the other day and within range today.  Appetite stable      Telephone on 03/03/2020   Component Date Value Ref Range Status    Glucose 03/04/2020 96  65 - 99 mg/dL Final    BUN, Bld 03/04/2020 32* 7 - 25 mg/dL Final    Creatinine 03/04/2020 1.40* 0.70 - 1.11 mg/dL Final    eGFR if non African American 03/04/2020 45* > OR = 60 mL/min/1.73m2 Final    eGFR if African American 03/04/2020 53* > OR = 60 mL/min/1.73m2 Final    BUN/Creatinine Ratio 03/04/2020 23* 6 - 22 (calc) Final    Sodium 03/04/2020 145  135 - 146 mmol/L Final    Potassium 03/04/2020 4.4  3.5 - 5.3 mmol/L Final    Chloride 03/04/2020 105  98 - 110 mmol/L Final    CO2 03/04/2020 31  20 - 32 mmol/L Final    Calcium 03/04/2020 9.6  8.6 - 10.3 mg/dL Final    TSH w/reflex to FT4 03/04/2020 2.42  0.40 - 4.50 mIU/L Final    PROSTATE SPECIFIC ANTIGEN, SCR - Q* 03/04/2020 1.8  < OR = 4.0 ng/mL Final       Past Medical History:   Diagnosis Date    Cataract     Dementia     Depression      Past Surgical History:   Procedure Laterality Date    APPENDECTOMY      EYE SURGERY       Family History   Problem Relation Age of Onset    Depression Mother        Marital Status:   Alcohol History:  has no alcohol history on file.  Tobacco History:  reports that he has never smoked. He has never used smokeless tobacco.  Drug History:   has no drug history on file.    Health Maintenance Topics with due status: Not Due       Topic Last Completion Date    Lipid Panel 09/10/2019     Immunization History   Administered Date(s) Administered    Influenza - High Dose - PF (65 years and older) 10/10/2017, 10/04/2018       Review of patient's allergies indicates:  No Known Allergies    Current Outpatient Medications:     amLODIPine (NORVASC) 5 MG tablet, Take 1 tablet (5 mg total) by mouth once daily., Disp: 90 tablet, Rfl: 1    aspirin (ECOTRIN) 81 MG EC tablet, Take 81 mg by mouth once daily., Disp: , Rfl:     azithromycin 1% (AZASITE) 1 % Drop, 1 drop 2 (two) times daily., Disp: , Rfl:     carbidopa-levodopa  mg (SINEMET)  mg per tablet, Take 1 tablet by mouth 3 (three) times daily., Disp: 270 tablet, Rfl: 1    donepezil (ARICEPT) 10 MG tablet, Take 1 tablet (10 mg total) by mouth every evening., Disp: 90 tablet, Rfl: 3    doxycycline (VIBRAMYCIN) 100 MG Cap, , Disp: , Rfl:     escitalopram oxalate (LEXAPRO) 10 MG tablet, Take 1 tablet (10 mg total) by mouth once daily., Disp: 90 tablet, Rfl: 1    lovastatin (MEVACOR) 40 MG tablet, Take 1 tablet (40 mg total) by mouth every evening., Disp: 90 tablet, Rfl: 1    lutein 40 mg Cap, Take 1 capsule by mouth once daily., Disp: , Rfl:     multivitamin (ONE DAILY MULTIVITAMIN) per tablet, Take 1 tablet by mouth once daily., Disp: , Rfl:     Review of Systems   Constitutional: Negative for chills, fever and unexpected weight change (Weight up 4 lb since last visit).   Respiratory: Negative for cough, shortness of breath and wheezing.    Cardiovascular: Negative for chest pain, palpitations and leg swelling.   Gastrointestinal: Negative for abdominal pain, constipation and diarrhea.   Genitourinary: Positive for frequency (3-4 times/night). Negative for dysuria and hematuria.   Musculoskeletal: Positive for gait problem ( slow shuffling gait though wife states this seems to be better  "recently). Negative for arthralgias.   Skin: Negative for rash.   Neurological: Positive for tremors ( improved). Negative for dizziness and headaches.   Psychiatric/Behavioral: Positive for sleep disturbance (wife reports acts out all nigth during his sleep- kicking and swinging his arms and legs. Now sleeping in hospital bed with rails to prevent him from falling out of bed). Negative for dysphoric mood. The patient is nervous/anxious.           Objective:      Vitals:    03/11/20 0957   BP: 120/72   Pulse: (!) 56   SpO2: 99%   Weight: 60.8 kg (134 lb)   Height: 5' 7" (1.702 m)     Physical Exam   Constitutional: He is oriented to person, place, and time. He appears well-developed and well-nourished.   Elderly thin white male   HENT:   Head: Normocephalic and atraumatic.   Mouth/Throat: Mucous membranes are normal. No posterior oropharyngeal erythema.   bilat canals obstructed with cerumen. bilat hearing aids in place   Neck: Neck supple. Carotid bruit is not present.   Cardiovascular: Normal rate and regular rhythm. Exam reveals no gallop and no friction rub.   No murmur heard.  Pulmonary/Chest: Effort normal and breath sounds normal. No respiratory distress. He has no wheezes. He has no rales.   Abdominal: Soft. He exhibits no distension. There is no tenderness.   Musculoskeletal: He exhibits no edema.   Lymphadenopathy:     He has no cervical adenopathy.   Neurological: He is alert and oriented to person, place, and time. He has normal strength. He displays no tremor. Gait normal.   Skin: Skin is warm and dry. No rash noted.   Psychiatric:   Flat affect   Nursing note and vitals reviewed.        Assessment:       1. Parkinson's disease    2. Essential hypertension    3. Mixed hyperlipidemia    4. Mild depression    5. CKD (chronic kidney disease) stage 3, GFR 30-59 ml/min           Plan:       Parkinson's disease  -     carbidopa-levodopa  mg (SINEMET)  mg per tablet; Take 1 tablet by mouth 3 " (three) times daily.  Dispense: 270 tablet; Refill: 1  -overall stable on med    Essential hypertension  -     amLODIPine (NORVASC) 5 MG tablet; Take 1 tablet (5 mg total) by mouth once daily.  Dispense: 90 tablet; Refill: 1  -BP well controlled today    Mixed hyperlipidemia  -     lovastatin (MEVACOR) 40 MG tablet; Take 1 tablet (40 mg total) by mouth every evening.  Dispense: 90 tablet; Refill: 1  -Stable on last labs    Mild depression  -     escitalopram oxalate (LEXAPRO) 10 MG tablet; Take 1 tablet (10 mg total) by mouth once daily.  Dispense: 90 tablet; Refill: 1  -patient/wife report mood has been stable    CKD 3  -stable on recent labs though BUN remains slightly elevated, encouraged to increase water intake and patient/wife cautioned to avoid anti-inflammatories    Follow up in about 6 months (around 9/11/2020) for HTN, parkinson's.        3/11/2020 Gricelda Araya NP

## 2020-03-16 ENCOUNTER — TELEPHONE (OUTPATIENT)
Dept: FAMILY MEDICINE | Facility: CLINIC | Age: 85
End: 2020-03-16

## 2020-03-16 NOTE — TELEPHONE ENCOUNTER
----- Message from Tyrell Daniel MD sent at 3/15/2020  9:49 AM CDT -----  Call patient. His kidney function looks stable with GFR 45 which is slightly improved.  Thyroid and prostate are normal.  PSA equals 1.8.  Repeat PSA in 1 year.

## 2020-04-09 ENCOUNTER — TELEPHONE (OUTPATIENT)
Dept: FAMILY MEDICINE | Facility: CLINIC | Age: 85
End: 2020-04-09

## 2020-04-09 DIAGNOSIS — G20.A1 PARKINSON'S DISEASE: Primary | ICD-10-CM

## 2020-04-09 NOTE — TELEPHONE ENCOUNTER
----- Message from Crow Sanches sent at 4/9/2020  9:46 AM CDT -----  Pt has a hosp bed and is needing a mattress , needing a rx for a gel overway matteress   [facility]   Pt wife [phone]

## 2020-08-18 DIAGNOSIS — F03.90 DEMENTIA WITHOUT BEHAVIORAL DISTURBANCE, UNSPECIFIED DEMENTIA TYPE: ICD-10-CM

## 2020-08-18 RX ORDER — DONEPEZIL HYDROCHLORIDE 10 MG/1
10 TABLET, FILM COATED ORAL NIGHTLY
Qty: 90 TABLET | Refills: 3 | Status: SHIPPED | OUTPATIENT
Start: 2020-08-18 | End: 2020-09-09 | Stop reason: SDUPTHER

## 2020-09-03 ENCOUNTER — TELEPHONE (OUTPATIENT)
Dept: FAMILY MEDICINE | Facility: CLINIC | Age: 85
End: 2020-09-03

## 2020-09-03 LAB
ALBUMIN SERPL-MCNC: 4.3 G/DL (ref 3.6–5.1)
ALBUMIN/GLOB SERPL: 1.9 (CALC) (ref 1–2.5)
ALP SERPL-CCNC: 66 U/L (ref 35–144)
ALT SERPL-CCNC: 25 U/L (ref 9–46)
AST SERPL-CCNC: 23 U/L (ref 10–35)
BASOPHILS # BLD AUTO: 49 CELLS/UL (ref 0–200)
BASOPHILS NFR BLD AUTO: 0.8 %
BILIRUB SERPL-MCNC: 0.7 MG/DL (ref 0.2–1.2)
BUN SERPL-MCNC: 31 MG/DL (ref 7–25)
BUN/CREAT SERPL: 23 (CALC) (ref 6–22)
CALCIUM SERPL-MCNC: 9.7 MG/DL (ref 8.6–10.3)
CHLORIDE SERPL-SCNC: 105 MMOL/L (ref 98–110)
CHOLEST SERPL-MCNC: 184 MG/DL
CHOLEST/HDLC SERPL: 2.7 (CALC)
CO2 SERPL-SCNC: 33 MMOL/L (ref 20–32)
CREAT SERPL-MCNC: 1.34 MG/DL (ref 0.7–1.11)
EOSINOPHIL # BLD AUTO: 220 CELLS/UL (ref 15–500)
EOSINOPHIL NFR BLD AUTO: 3.6 %
ERYTHROCYTE [DISTWIDTH] IN BLOOD BY AUTOMATED COUNT: 12 % (ref 11–15)
GFRSERPLBLD MDRD-ARVRAT: 48 ML/MIN/1.73M2
GLOBULIN SER CALC-MCNC: 2.3 G/DL (CALC) (ref 1.9–3.7)
GLUCOSE SERPL-MCNC: 97 MG/DL (ref 65–99)
HCT VFR BLD AUTO: 43.1 % (ref 38.5–50)
HDLC SERPL-MCNC: 67 MG/DL
HGB BLD-MCNC: 14.1 G/DL (ref 13.2–17.1)
LDLC SERPL CALC-MCNC: 98 MG/DL (CALC)
LYMPHOCYTES # BLD AUTO: 1519 CELLS/UL (ref 850–3900)
LYMPHOCYTES NFR BLD AUTO: 24.9 %
MCH RBC QN AUTO: 31.7 PG (ref 27–33)
MCHC RBC AUTO-ENTMCNC: 32.7 G/DL (ref 32–36)
MCV RBC AUTO: 96.9 FL (ref 80–100)
MONOCYTES # BLD AUTO: 451 CELLS/UL (ref 200–950)
MONOCYTES NFR BLD AUTO: 7.4 %
NEUTROPHILS # BLD AUTO: 3861 CELLS/UL (ref 1500–7800)
NEUTROPHILS NFR BLD AUTO: 63.3 %
NONHDLC SERPL-MCNC: 117 MG/DL (CALC)
PLATELET # BLD AUTO: 189 THOUSAND/UL (ref 140–400)
PMV BLD REES-ECKER: 11.2 FL (ref 7.5–12.5)
POTASSIUM SERPL-SCNC: 4.8 MMOL/L (ref 3.5–5.3)
PROT SERPL-MCNC: 6.6 G/DL (ref 6.1–8.1)
RBC # BLD AUTO: 4.45 MILLION/UL (ref 4.2–5.8)
SODIUM SERPL-SCNC: 145 MMOL/L (ref 135–146)
TRIGL SERPL-MCNC: 93 MG/DL
WBC # BLD AUTO: 6.1 THOUSAND/UL (ref 3.8–10.8)

## 2020-09-03 NOTE — TELEPHONE ENCOUNTER
Left a message for patient to call back regarding his appointment with Dr. Daniel next week. Need to offer the patient a virtual or audio appointment.

## 2020-09-09 ENCOUNTER — OFFICE VISIT (OUTPATIENT)
Dept: FAMILY MEDICINE | Facility: CLINIC | Age: 85
End: 2020-09-09
Payer: MEDICARE

## 2020-09-09 VITALS
BODY MASS INDEX: 20.09 KG/M2 | DIASTOLIC BLOOD PRESSURE: 68 MMHG | WEIGHT: 128 LBS | SYSTOLIC BLOOD PRESSURE: 124 MMHG | TEMPERATURE: 98 F | HEIGHT: 67 IN | HEART RATE: 60 BPM

## 2020-09-09 DIAGNOSIS — I10 ESSENTIAL HYPERTENSION: ICD-10-CM

## 2020-09-09 DIAGNOSIS — G20.A1 PARKINSON'S DISEASE: ICD-10-CM

## 2020-09-09 DIAGNOSIS — R41.3 MEMORY LOSS: ICD-10-CM

## 2020-09-09 DIAGNOSIS — N18.30 CHRONIC KIDNEY DISEASE, STAGE 3: ICD-10-CM

## 2020-09-09 DIAGNOSIS — Z23 NEED FOR IMMUNIZATION AGAINST INFLUENZA: ICD-10-CM

## 2020-09-09 DIAGNOSIS — R29.6 FALLS FREQUENTLY: ICD-10-CM

## 2020-09-09 DIAGNOSIS — E78.2 MIXED HYPERLIPIDEMIA: ICD-10-CM

## 2020-09-09 DIAGNOSIS — F32.A MILD DEPRESSION: ICD-10-CM

## 2020-09-09 DIAGNOSIS — Z12.5 SPECIAL SCREENING FOR MALIGNANT NEOPLASM OF PROSTATE: ICD-10-CM

## 2020-09-09 DIAGNOSIS — F03.90 DEMENTIA WITHOUT BEHAVIORAL DISTURBANCE, UNSPECIFIED DEMENTIA TYPE: Primary | ICD-10-CM

## 2020-09-09 DIAGNOSIS — G47.52 REM SLEEP BEHAVIOR DISORDER: ICD-10-CM

## 2020-09-09 PROCEDURE — 3078F DIAST BP <80 MM HG: CPT | Mod: S$GLB,,, | Performed by: FAMILY MEDICINE

## 2020-09-09 PROCEDURE — 1101F PT FALLS ASSESS-DOCD LE1/YR: CPT | Mod: S$GLB,,, | Performed by: FAMILY MEDICINE

## 2020-09-09 PROCEDURE — 90662 IIV NO PRSV INCREASED AG IM: CPT | Mod: S$GLB,,, | Performed by: FAMILY MEDICINE

## 2020-09-09 PROCEDURE — 3078F PR MOST RECENT DIASTOLIC BLOOD PRESSURE < 80 MM HG: ICD-10-PCS | Mod: S$GLB,,, | Performed by: FAMILY MEDICINE

## 2020-09-09 PROCEDURE — 3074F SYST BP LT 130 MM HG: CPT | Mod: S$GLB,,, | Performed by: FAMILY MEDICINE

## 2020-09-09 PROCEDURE — 90662 FLU VACCINE - QUADRIVALENT - HIGH DOSE (65+) PRESERVATIVE FREE IM: ICD-10-PCS | Mod: S$GLB,,, | Performed by: FAMILY MEDICINE

## 2020-09-09 PROCEDURE — 99214 PR OFFICE/OUTPT VISIT, EST, LEVL IV, 30-39 MIN: ICD-10-PCS | Mod: 25,S$GLB,, | Performed by: FAMILY MEDICINE

## 2020-09-09 PROCEDURE — 1159F MED LIST DOCD IN RCRD: CPT | Mod: S$GLB,,, | Performed by: FAMILY MEDICINE

## 2020-09-09 PROCEDURE — G0008 FLU VACCINE - QUADRIVALENT - HIGH DOSE (65+) PRESERVATIVE FREE IM: ICD-10-PCS | Mod: S$GLB,,, | Performed by: FAMILY MEDICINE

## 2020-09-09 PROCEDURE — 1101F PR PT FALLS ASSESS DOC 0-1 FALLS W/OUT INJ PAST YR: ICD-10-PCS | Mod: S$GLB,,, | Performed by: FAMILY MEDICINE

## 2020-09-09 PROCEDURE — 3074F PR MOST RECENT SYSTOLIC BLOOD PRESSURE < 130 MM HG: ICD-10-PCS | Mod: S$GLB,,, | Performed by: FAMILY MEDICINE

## 2020-09-09 PROCEDURE — G0008 ADMIN INFLUENZA VIRUS VAC: HCPCS | Mod: S$GLB,,, | Performed by: FAMILY MEDICINE

## 2020-09-09 PROCEDURE — 1159F PR MEDICATION LIST DOCUMENTED IN MEDICAL RECORD: ICD-10-PCS | Mod: S$GLB,,, | Performed by: FAMILY MEDICINE

## 2020-09-09 PROCEDURE — 99214 OFFICE O/P EST MOD 30 MIN: CPT | Mod: 25,S$GLB,, | Performed by: FAMILY MEDICINE

## 2020-09-09 RX ORDER — CARBIDOPA AND LEVODOPA 25; 100 MG/1; MG/1
1 TABLET ORAL 3 TIMES DAILY
Qty: 270 TABLET | Refills: 1 | Status: SHIPPED | OUTPATIENT
Start: 2020-09-09 | End: 2021-02-01 | Stop reason: SDUPTHER

## 2020-09-09 RX ORDER — ESCITALOPRAM OXALATE 10 MG/1
10 TABLET ORAL DAILY
Qty: 90 TABLET | Refills: 1 | Status: SHIPPED | OUTPATIENT
Start: 2020-09-09 | End: 2021-02-01 | Stop reason: SDUPTHER

## 2020-09-09 RX ORDER — LOVASTATIN 40 MG/1
40 TABLET ORAL NIGHTLY
Qty: 90 TABLET | Refills: 1 | Status: SHIPPED | OUTPATIENT
Start: 2020-09-09 | End: 2021-02-01 | Stop reason: SDUPTHER

## 2020-09-09 RX ORDER — DONEPEZIL HYDROCHLORIDE 10 MG/1
10 TABLET, FILM COATED ORAL NIGHTLY
Qty: 90 TABLET | Refills: 3 | Status: ON HOLD | OUTPATIENT
Start: 2020-09-09 | End: 2021-08-23 | Stop reason: HOSPADM

## 2020-09-09 NOTE — PROGRESS NOTES
SUBJECTIVE:    Patient ID: Bruno Manjarrez is a 85 y.o. male.    Chief Complaint: Follow-up (brought bottles // states he has had pna shot // agrees to flu ac)    85-year-old male comes in for his six-month checkup.  He has a history of Parkinson's disease and dementia.  He has balance issues and been falling easily.  Wife states that he fell backwards and broke the tank  on the toilet recently.  Patient does walk with a Rollator for balance eats 3 meals a day and occasionally complains of a headache.  Patient has hallucinations and sees people that are not in the house.  Does have nightmares and acting out in his dreams with thrashing and punching.      No visits with results within 6 Month(s) from this visit.   Latest known visit with results is:   Telephone on 03/03/2020   Component Date Value Ref Range Status    Glucose 03/04/2020 96  65 - 99 mg/dL Final    BUN, Bld 03/04/2020 32* 7 - 25 mg/dL Final    Creatinine 03/04/2020 1.40* 0.70 - 1.11 mg/dL Final    eGFR if non African American 03/04/2020 45* > OR = 60 mL/min/1.73m2 Final    eGFR if African American 03/04/2020 53* > OR = 60 mL/min/1.73m2 Final    BUN/Creatinine Ratio 03/04/2020 23* 6 - 22 (calc) Final    Sodium 03/04/2020 145  135 - 146 mmol/L Final    Potassium 03/04/2020 4.4  3.5 - 5.3 mmol/L Final    Chloride 03/04/2020 105  98 - 110 mmol/L Final    CO2 03/04/2020 31  20 - 32 mmol/L Final    Calcium 03/04/2020 9.6  8.6 - 10.3 mg/dL Final    TSH w/reflex to FT4 03/04/2020 2.42  0.40 - 4.50 mIU/L Final    PROSTATE SPECIFIC ANTIGEN, SCR - Q* 03/04/2020 1.8  < OR = 4.0 ng/mL Final       Past Medical History:   Diagnosis Date    Cataract     Dementia     Depression      Social History     Socioeconomic History    Marital status:      Spouse name: Not on file    Number of children: Not on file    Years of education: Not on file    Highest education level: Not on file   Occupational History    Not on file   Social Needs     Financial resource strain: Not on file    Food insecurity     Worry: Not on file     Inability: Not on file    Transportation needs     Medical: Not on file     Non-medical: Not on file   Tobacco Use    Smoking status: Never Smoker    Smokeless tobacco: Never Used   Substance and Sexual Activity    Alcohol use: Not on file    Drug use: Not on file    Sexual activity: Not on file   Lifestyle    Physical activity     Days per week: Not on file     Minutes per session: Not on file    Stress: Not on file   Relationships    Social connections     Talks on phone: Not on file     Gets together: Not on file     Attends Orthodoxy service: Not on file     Active member of club or organization: Not on file     Attends meetings of clubs or organizations: Not on file     Relationship status: Not on file   Other Topics Concern    Not on file   Social History Narrative    Not on file     Past Surgical History:   Procedure Laterality Date    APPENDECTOMY      EYE SURGERY       Family History   Problem Relation Age of Onset    Depression Mother        Review of patient's allergies indicates:  No Known Allergies    Current Outpatient Medications:     carbidopa-levodopa  mg (SINEMET)  mg per tablet, Take 1 tablet by mouth 3 (three) times daily., Disp: 270 tablet, Rfl: 1    donepeziL (ARICEPT) 10 MG tablet, Take 1 tablet (10 mg total) by mouth every evening., Disp: 90 tablet, Rfl: 3    escitalopram oxalate (LEXAPRO) 10 MG tablet, Take 1 tablet (10 mg total) by mouth once daily., Disp: 90 tablet, Rfl: 1    lovastatin (MEVACOR) 40 MG tablet, Take 1 tablet (40 mg total) by mouth every evening., Disp: 90 tablet, Rfl: 1    amLODIPine (NORVASC) 5 MG tablet, Take 1 tablet (5 mg total) by mouth once daily., Disp: 90 tablet, Rfl: 1    aspirin (ECOTRIN) 81 MG EC tablet, Take 81 mg by mouth once daily., Disp: , Rfl:     azithromycin 1% (AZASITE) 1 % Drop, 1 drop 2 (two) times daily., Disp: , Rfl:     lutein 40  "mg Cap, Take 1 capsule by mouth once daily., Disp: , Rfl:     multivitamin (ONE DAILY MULTIVITAMIN) per tablet, Take 1 tablet by mouth once daily., Disp: , Rfl:     Review of Systems   Constitutional: Positive for appetite change (good appetite 3  meals a  day). Negative for chills, fatigue, fever and unexpected weight change.   HENT: Negative for congestion, ear pain and trouble swallowing.    Eyes: Negative for pain, discharge and visual disturbance.   Respiratory: Negative for apnea, cough, shortness of breath and wheezing.         Snores  A lot   Cardiovascular: Negative for chest pain and leg swelling.   Gastrointestinal: Negative for abdominal pain, blood in stool, constipation, diarrhea, nausea and vomiting.   Endocrine: Negative for cold intolerance, heat intolerance and polydipsia.   Genitourinary: Negative for dysuria, hematuria, testicular pain and urgency.        Nocturia  4 x night   Musculoskeletal: Positive for back pain (left  ribs are  sore  frrom a  fall..). Negative for gait problem, joint swelling and myalgias.   Neurological: Positive for speech difficulty (occas has  trouble  expressing his thoughts). Negative for dizziness, seizures and numbness.        Falls frequently.   Psychiatric/Behavioral: Positive for behavioral problems and confusion (cant  remember how to use  microwave , ). Negative for agitation and hallucinations. The patient is not nervous/anxious.           Objective:      Vitals:    09/09/20 1025   BP: 124/68   Pulse: 60   Temp: 98.3 °F (36.8 °C)   Weight: 58.1 kg (128 lb)   Height: 5' 7" (1.702 m)     Physical Exam  Vitals signs and nursing note reviewed.   Constitutional:       Appearance: He is well-developed.      Comments: Elderly male with a flat affect, poor spontaneous speech   HENT:      Head: Normocephalic and atraumatic.      Right Ear: External ear normal.      Left Ear: External ear normal.      Nose: Nose normal.   Eyes:      Pupils: Pupils are equal, round, and " reactive to light.   Neck:      Musculoskeletal: Normal range of motion and neck supple.      Thyroid: No thyromegaly.      Vascular: No carotid bruit.   Cardiovascular:      Rate and Rhythm: Normal rate and regular rhythm.      Heart sounds: Normal heart sounds. No murmur.   Pulmonary:      Effort: Pulmonary effort is normal.      Breath sounds: Normal breath sounds. No wheezing or rales.   Abdominal:      General: Bowel sounds are normal. There is no distension.      Palpations: Abdomen is soft.      Tenderness: There is no abdominal tenderness.   Musculoskeletal: Normal range of motion.         General: No tenderness or deformity.      Lumbar back: Normal. He exhibits no pain and no spasm.      Comments: Bends 90 degrees at  waist, shoulders and knees have good range of motion, mild muscle atrophy noted in arms and legs.   Lymphadenopathy:      Cervical: No cervical adenopathy.   Skin:     General: Skin is warm and dry.      Findings: No rash.   Neurological:      Mental Status: He is alert and oriented to person, place, and time.      Cranial Nerves: No cranial nerve deficit.      Coordination: Coordination normal.      Comments: No gross tremors noticed today.  He does have some increased tone in his arms and legs but no overt cogwheeling.   Psychiatric:         Behavior: Behavior normal.         Thought Content: Thought content normal.         Judgment: Judgment normal.           Assessment:       1. Dementia without behavioral disturbance, unspecified dementia type    2. Need for immunization against influenza    3. Mixed hyperlipidemia    4. Mild depression    5. Parkinson's disease    6. Special screening for malignant neoplasm of prostate    7. Memory loss    8. Essential hypertension    9. Chronic kidney disease, stage 3    10. Falls frequently    11. REM sleep behavior disorder         Plan:       Dementia without behavioral disturbance, unspecified dementia type  -     donepeziL (ARICEPT) 10 MG tablet;  Take 1 tablet (10 mg total) by mouth every evening.  Dispense: 90 tablet; Refill: 3  Patient has advancing dementia, poor balance is present and multiple falls.  We recommend a toilet seat with handrails for better balance in the bathroom    Need for immunization against influenza  -     Influenza - Quadrivalent - High Dose (65+) (PF) (IM)  Flu shot todayMixed hyperlipidemia  -     lovastatin (MEVACOR) 40 MG tablet; Take 1 tablet (40 mg total) by mouth every evening.  Dispense: 90 tablet; Refill: 1  -     CBC auto differential; Future; Expected date: 09/09/2020  -     Lipid Panel; Future; Expected date: 09/09/2020  -     Comprehensive metabolic panel; Future; Expected date: 09/09/2020  Cholesterol 184 triglycerides 93 LDL 98 excellent lipid panel.  Mild depression  -     escitalopram oxalate (LEXAPRO) 10 MG tablet; Take 1 tablet (10 mg total) by mouth once daily.  Dispense: 90 tablet; Refill: 1  Continue Lexapro  Parkinson's disease  -     carbidopa-levodopa  mg (SINEMET)  mg per tablet; Take 1 tablet by mouth 3 (three) times daily.  Dispense: 270 tablet; Refill: 1  Patient currently sees  for Parkinson's medications  Special screening for malignant neoplasm of prostate  -     PSA, Screening; Future; Expected date: 09/09/2020  PSA ordered  Memory loss    Essential hypertension  Blood pressure excellent currently  Chronic kidney disease, stage 3  BUN 31 creatinine 1.34 GFR equals 48  Falls frequently  Continue Rollator use  REM sleep behavior disorder      Follow up in about 6 months (around 3/9/2021), or Parkinson's/dementia.        9/10/2020 Tyrell Daniel

## 2020-09-19 ENCOUNTER — HOSPITAL ENCOUNTER (EMERGENCY)
Facility: HOSPITAL | Age: 85
Discharge: HOME | End: 2020-09-19
Attending: EMERGENCY MEDICINE
Payer: MEDICARE

## 2020-09-19 VITALS
OXYGEN SATURATION: 98 % | HEIGHT: 67 IN | BODY MASS INDEX: 20.09 KG/M2 | HEART RATE: 57 BPM | RESPIRATION RATE: 20 BRPM | SYSTOLIC BLOOD PRESSURE: 175 MMHG | TEMPERATURE: 99 F | DIASTOLIC BLOOD PRESSURE: 80 MMHG | WEIGHT: 128 LBS

## 2020-09-19 DIAGNOSIS — S51.812A SKIN TEAR OF LEFT FOREARM WITHOUT COMPLICATION, INITIAL ENCOUNTER: ICD-10-CM
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
## 2020-09-19 DIAGNOSIS — S00.03XA HEMATOMA OF FRONTAL SCALP, INITIAL ENCOUNTER: ICD-10-CM

## 2020-09-19 DIAGNOSIS — S80.211A ABRASION, RIGHT KNEE, INITIAL ENCOUNTER: ICD-10-CM

## 2020-09-19 DIAGNOSIS — S09.90XA INJURY OF HEAD, INITIAL ENCOUNTER: Primary | ICD-10-CM

## 2020-09-19 LAB
ALBUMIN SERPL BCP-MCNC: 4 G/DL (ref 3.5–5.2)
ALP SERPL-CCNC: 80 U/L (ref 55–135)
ALT SERPL W/O P-5'-P-CCNC: 21 U/L (ref 10–44)
ANION GAP SERPL CALC-SCNC: 11 MMOL/L (ref 8–16)
AST SERPL-CCNC: 21 U/L (ref 10–40)
BASOPHILS # BLD AUTO: 0.04 K/UL (ref 0–0.2)
BASOPHILS NFR BLD: 0.5 % (ref 0–1.9)
BILIRUB SERPL-MCNC: 1 MG/DL (ref 0.1–1)
BUN SERPL-MCNC: 24 MG/DL (ref 8–23)
CALCIUM SERPL-MCNC: 9.3 MG/DL (ref 8.7–10.5)
CHLORIDE SERPL-SCNC: 104 MMOL/L (ref 95–110)
CO2 SERPL-SCNC: 28 MMOL/L (ref 23–29)
CREAT SERPL-MCNC: 1.3 MG/DL (ref 0.5–1.4)
DIFFERENTIAL METHOD: ABNORMAL
EOSINOPHIL # BLD AUTO: 0.3 K/UL (ref 0–0.5)
EOSINOPHIL NFR BLD: 3.3 % (ref 0–8)
ERYTHROCYTE [DISTWIDTH] IN BLOOD BY AUTOMATED COUNT: 12.3 % (ref 11.5–14.5)
EST. GFR  (AFRICAN AMERICAN): 57.5 ML/MIN/1.73 M^2
EST. GFR  (NON AFRICAN AMERICAN): 49.8 ML/MIN/1.73 M^2
GLUCOSE SERPL-MCNC: 98 MG/DL (ref 70–110)
HCT VFR BLD AUTO: 42.4 % (ref 40–54)
HGB BLD-MCNC: 13.8 G/DL (ref 14–18)
IMM GRANULOCYTES # BLD AUTO: 0.03 K/UL (ref 0–0.04)
IMM GRANULOCYTES NFR BLD AUTO: 0.4 % (ref 0–0.5)
LYMPHOCYTES # BLD AUTO: 1 K/UL (ref 1–4.8)
LYMPHOCYTES NFR BLD: 12.4 % (ref 18–48)
MCH RBC QN AUTO: 31.6 PG (ref 27–31)
MCHC RBC AUTO-ENTMCNC: 32.5 G/DL (ref 32–36)
MCV RBC AUTO: 97 FL (ref 82–98)
MONOCYTES # BLD AUTO: 0.6 K/UL (ref 0.3–1)
MONOCYTES NFR BLD: 7 % (ref 4–15)
NEUTROPHILS # BLD AUTO: 6.4 K/UL (ref 1.8–7.7)
NEUTROPHILS NFR BLD: 76.4 % (ref 38–73)
NRBC BLD-RTO: 0 /100 WBC
PLATELET # BLD AUTO: 181 K/UL (ref 150–350)
PMV BLD AUTO: 11.4 FL (ref 9.2–12.9)
POTASSIUM SERPL-SCNC: 4.8 MMOL/L (ref 3.5–5.1)
PROT SERPL-MCNC: 7 G/DL (ref 6–8.4)
RBC # BLD AUTO: 4.37 M/UL (ref 4.6–6.2)
SODIUM SERPL-SCNC: 143 MMOL/L (ref 136–145)
WBC # BLD AUTO: 8.3 K/UL (ref 3.9–12.7)

## 2020-09-19 PROCEDURE — 85025 COMPLETE CBC W/AUTO DIFF WBC: CPT

## 2020-09-19 PROCEDURE — 93005 ELECTROCARDIOGRAM TRACING: CPT | Performed by: SPECIALIST

## 2020-09-19 PROCEDURE — 80053 COMPREHEN METABOLIC PANEL: CPT

## 2020-09-19 PROCEDURE — 93010 ELECTROCARDIOGRAM REPORT: CPT | Mod: ,,, | Performed by: SPECIALIST

## 2020-09-19 PROCEDURE — 99285 EMERGENCY DEPT VISIT HI MDM: CPT | Mod: 25

## 2020-09-19 PROCEDURE — 90471 IMMUNIZATION ADMIN: CPT | Performed by: EMERGENCY MEDICINE

## 2020-09-19 PROCEDURE — 93010 EKG 12-LEAD: ICD-10-PCS | Mod: ,,, | Performed by: SPECIALIST

## 2020-09-19 PROCEDURE — 90714 TD VACC NO PRESV 7 YRS+ IM: CPT | Performed by: EMERGENCY MEDICINE

## 2020-09-19 PROCEDURE — 63600175 PHARM REV CODE 636 W HCPCS: Performed by: EMERGENCY MEDICINE

## 2020-09-19 RX ADMIN — TETANUS AND DIPHTHERIA TOXOIDS ADSORBED 0.5 ML: 2; 2 INJECTION INTRAMUSCULAR at 09:09

## 2020-09-19 NOTE — ED NOTES
ALERT AND MAEW.  L FOREHEAD HEMATOMA.  TEETH INTACT.  CONFUSES BUT KNOWS NAME AND .  BILATERAL FA SKIN TEARS AT ELBOW AREAS.  BLEEDING STOPPED AND BULKY LFA DRESSING.  OLD CONTUSIONS AT BILATERAL KNEES. SCABBED R KNEE AND PAEZ NOTED.  NON LABORED RESPIRATIONS AND NSR AT MONITOR.  SOFT NON DISTENDED ABDOMEN NOTED.  SKIN WARM AND DRY.   IV ACCESS AT RAC SITE CLEAR. BRISK CAPILLARY REFILL NOTED.PULSES REGULAR.  FALLS PRECAUTION S.

## 2020-09-19 NOTE — DISCHARGE INSTRUCTIONS
Ice pack to the head 48 hours.  Tylenol for pain.  Head injury precautions.  Return to the ER if needed.  Cleanse the wounds twice daily and apply Neosporin ointment.

## 2020-09-19 NOTE — ED PROVIDER NOTES
Encounter Date: 9/19/2020       History     Chief Complaint   Patient presents with    Fall     85-year-old male brought to emergency room by EMS from his residence with a history the patient fell this morning.  The patient does have some history of depression and dementia and therefore has some limited history.  He is not aware as to why he may have fallen.  The patient did sustain a hematoma to the left frontal area of his head as well as some skin tear to his left extensor proximal forearm and right knee anteriorly.  Tetanus status is unknown.  The patient also complained of some neck pain.  No history of any vomiting.  He denies any shortness of breath but does complain of some right-sided chest wall pain.  No complaints of any abdominal pain.  No complaints of any pelvic pain or pain to the right arm or left knee.        Review of patient's allergies indicates:  No Known Allergies  Past Medical History:   Diagnosis Date    Cataract     Dementia     Depression      Past Surgical History:   Procedure Laterality Date    APPENDECTOMY      EYE SURGERY       Family History   Problem Relation Age of Onset    Depression Mother      Social History     Tobacco Use    Smoking status: Never Smoker    Smokeless tobacco: Never Used   Substance Use Topics    Alcohol use: Not on file    Drug use: Not on file     Review of Systems   Constitutional: Negative for fever.   HENT: Negative.    Respiratory: Negative for cough and shortness of breath.    Cardiovascular: Positive for chest pain. Negative for palpitations.   Gastrointestinal: Negative for abdominal pain, nausea and vomiting.   Genitourinary:        Incontinent   Musculoskeletal: Positive for neck pain.   Neurological: Negative for syncope and headaches.   All other systems reviewed and are negative.      Physical Exam     Initial Vitals [09/19/20 0832]   BP Pulse Resp Temp SpO2   (!) 192/92 60 20 98.4 °F (36.9 °C) 98 %      MAP       --         Physical  Exam    Vitals reviewed.  Constitutional: He appears well-developed and well-nourished. He is not diaphoretic. No distress.   HENT:   Head: Normocephalic.   Right Ear: External ear normal.   Left Ear: External ear normal.   Nose: Nose normal.   Mouth/Throat: Oropharynx is clear and moist.   Hematoma left frontal area   Eyes: Conjunctivae and EOM are normal. Pupils are equal, round, and reactive to light. Right eye exhibits no discharge. Left eye exhibits no discharge.   Neck: Normal range of motion. Neck supple. No JVD present.   Tender posterior midline   Cardiovascular: Normal rate, regular rhythm, normal heart sounds and intact distal pulses. Exam reveals no gallop and no friction rub.    No murmur heard.  Pulmonary/Chest: Breath sounds normal. No respiratory distress. He has no wheezes. He has no rhonchi. He has no rales. He exhibits tenderness.   Abdominal: Soft. Bowel sounds are normal. He exhibits no distension. There is no abdominal tenderness. There is no rebound and no guarding.   Genitourinary:    Penis normal.     Musculoskeletal: Normal range of motion. No tenderness or edema.   Lymphadenopathy:     He has no cervical adenopathy.   Neurological: He is alert and oriented to person, place, and time. He has normal strength. GCS score is 15. GCS eye subscore is 4. GCS verbal subscore is 5. GCS motor subscore is 6.   Skin: Skin is warm and dry. Capillary refill takes less than 2 seconds. No rash noted. No erythema. No pallor.         ED Course   Procedures  Labs Reviewed   CBC W/ AUTO DIFFERENTIAL - Abnormal; Notable for the following components:       Result Value    RBC 4.37 (*)     Hemoglobin 13.8 (*)     Mean Corpuscular Hemoglobin 31.6 (*)     Gran% 76.4 (*)     Lymph% 12.4 (*)     All other components within normal limits   COMPREHENSIVE METABOLIC PANEL - Abnormal; Notable for the following components:    BUN, Bld 24 (*)     eGFR if  57.5 (*)     eGFR if non  49.8 (*)      All other components within normal limits        ECG Results          EKG 12-lead (In process)  Result time 09/19/20 10:31:08    In process by Interface, Lab In [facility] (09/19/20 10:31:08)                 Narrative:    Test Reason : W19.XXXA,    Vent. Rate : 059 BPM     Atrial Rate : 059 BPM     P-R Int : 154 ms          QRS Dur : 096 ms      QT Int : 424 ms       P-R-T Axes : 083 070 072 degrees     QTc Int : 419 ms    Sinus bradycardia  Otherwise normal ECG  When compared with ECG of 03-DEC-1999 16:52,  Incomplete right bundle branch block is no longer Present    Referred By: [email or web address]   SELF           Confirmed By:                             Imaging Results          CT Cervical Spine Without Contrast (Final result)  Result time 09/19/20 10:21:34    Final result by Lee Manjarrez MD (09/19/20 10:21:34)                 Narrative:    CMS MANDATED QUALITY DATA - CT RADIATION  436    All CT scans at this facility utilize dose modulation, iterative  reconstruction, and/or weight based dosing when appropriate to reduce  radiation dose to as low as reasonably achievable.    CT CERVICAL SPINE WITHOUT CONTRAST    CLINICAL HISTORY:  85 years Male Neck pain, recent trauma    COMPARISON: None    FINDINGS: Craniocervical junction is intact. Moderate atlantodental  degenerative change. No cervical anterolisthesis or retrolisthesis. No  prevertebral soft tissue swelling.    Multilevel cervical facet arthropathy, most pronounced on the right at  C5-6 and C6-7, and on the left at L5. Small multilevel osteophyte  formation. Concavity of superior and inferior endplates of C3 and C5.  Inferior endplate cavity of C4 and C6 appearing chronic in nature. No  acute fracture of the cervical spine. Heterotopic ossification along  the anterior margin of the C5-6 disc.    Limited images through the lung apices demonstrate biapical pleural  parenchymal scarring. No gross cervical soft tissue abnormality is  evident.    IMPRESSION:    No  acute osseous abnormality involving the cervical spine.    Degenerative changes of the cervical spine as above.    Electronically Signed by Lee RAI on 9/19/2020 10:28 AM                             CT Head Without Contrast (Final result)  Result time 09/19/20 10:19:34    Final result by Lee Manjarrez MD (09/19/20 10:19:34)                 Narrative:    CMS MANDATED QUALITY DATA - CT RADIATION  436    All CT scans at this facility utilize dose modulation, iterative  reconstruction, and/or weight based dosing when appropriate to reduce  radiation dose to as low as reasonably achievable.    CT HEAD WITHOUT CONTRAST    CLINICAL HISTORY:  85 years Male Head trauma, minor (Age => 65y)    COMPARISON: None    FINDINGS: Negative for acute intracranial hemorrhage, midline shift,  or mass effect. Mild cerebral atrophy with associated ventricular and  sulcal enlargement. Mild periventricular hypoattenuation consistent  with microangiopathic change. Cerebellar hemispheres and brainstem are  unremarkable. Cavernous carotid artery atherosclerosis.    No calvarial lesion or fracture. Scalp hematomas involving the left  frontal and left parietal regions. Visualized paranasal sinuses and  mastoid air cells are clear.    IMPRESSION:    No CT evidence of acute intracranial pathology.    Left parietal and left frontal scalp hematomas.    Mild cerebral atrophy and white matter microangiopathic change.    Electronically Signed by Lee RAI on 9/19/2020 10:25 AM                             X-Ray Chest AP Portable (Final result)  Result time 09/19/20 09:12:32   Procedure changed from X-Ray Chest 1 View     Final result by Lee Manjarrez MD (09/19/20 09:12:32)                 Narrative:    XR CHEST AP PORTABLE    CLINICAL HISTORY:  85 years Male cp    COMPARISON: None    FINDINGS: Cardiac silhouette size is within normal limits.  Atherosclerotic calcification of the aorta. No airspace consolidation.  No  pleural effusion or pneumothorax. No acute osseous abnormality.    IMPRESSION: No acute pulmonary process.    Electronically Signed by Lee RAI on 9/19/2020 9:17 AM                                            Attending Attestation:             Attending ED Notes:   This 85-year-old male who has some dementia apparently fell off a chair as he had his depend diaper wrapped around his ankles falling onto his left side and sustaining a left frontal hematoma.  He also sustained abrasions to his right knee and a skin tear to the dorsal proximal left forearm.  CT scan of the head and cervical spine are negative for any acute anomalies.  Chest x-ray is clear.  Labs were reviewed and unremarkable.  The patient's skin tear on left arm and the abrasion on the right knee will be cleansed and dressed.  He will be able to be discharged to return to his home accompanied by his wife.                    Clinical Impression:     ICD-10-CM ICD-9-CM   1. Injury of head, initial encounter  S09.90XA 959.01   2. Fall  W19.XXXA E888.9   3. Hematoma of frontal scalp, initial encounter  S00.03XA 920   4. Abrasion, right knee, initial encounter  S80.211A 916.0   5. Skin tear of left forearm without complication, initial encounter  S51.812A 881.00                          ED Disposition Condition    Discharge Stable        ED Prescriptions     None        Follow-up Information     Follow up With Specialties Details Why Contact Info    Tyrell Daniel MD Family Medicine In 4 days For re-evaluation [address]  [address]  [facility] [place] [address]  [phone]                                         Patrick Santana Jr., MD  09/19/20 0524

## 2020-09-21 ENCOUNTER — TELEPHONE (OUTPATIENT)
Dept: FAMILY MEDICINE | Facility: CLINIC | Age: 85
End: 2020-09-21

## 2020-09-21 NOTE — TELEPHONE ENCOUNTER
----- Message from Torey Maier sent at 9/21/2020 11:18 AM CDT -----  Regarding: Needs HFU  Contact: Bruno Manjarrez  Pt was recently discharged from [facility] and now he needs a HFU . Please give wife  Rebecca a call back # [phone]  Wife says that she needs help with him because since he hit his head when he fell, she can't get him to the bathroom or anything. She also says that she needs some type of hospital bed/ mattress now that he is bed ridden. Dr. Daniel told her that it is time for her to get some help with the patient and she would like to know what does Dr. Daniel advise.

## 2020-09-22 ENCOUNTER — OFFICE VISIT (OUTPATIENT)
Dept: FAMILY MEDICINE | Facility: CLINIC | Age: 85
End: 2020-09-22
Payer: MEDICARE

## 2020-09-22 VITALS
HEIGHT: 67 IN | WEIGHT: 126 LBS | DIASTOLIC BLOOD PRESSURE: 48 MMHG | BODY MASS INDEX: 19.78 KG/M2 | HEART RATE: 56 BPM | SYSTOLIC BLOOD PRESSURE: 90 MMHG | TEMPERATURE: 98 F

## 2020-09-22 DIAGNOSIS — S41.119A SKIN TEAR OF UPPER EXTREMITY: ICD-10-CM

## 2020-09-22 DIAGNOSIS — F32.A DEPRESSION, UNSPECIFIED DEPRESSION TYPE: ICD-10-CM

## 2020-09-22 DIAGNOSIS — F03.90 DEMENTIA WITHOUT BEHAVIORAL DISTURBANCE, UNSPECIFIED DEMENTIA TYPE: ICD-10-CM
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
## 2020-09-22 DIAGNOSIS — G20.A1 PARKINSON'S DISEASE: ICD-10-CM

## 2020-09-22 DIAGNOSIS — N18.30 CHRONIC KIDNEY DISEASE, STAGE 3: ICD-10-CM

## 2020-09-22 DIAGNOSIS — R41.3 MEMORY LOSS: ICD-10-CM
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
## 2020-09-22 PROCEDURE — 1159F MED LIST DOCD IN RCRD: CPT | Mod: S$GLB,,, | Performed by: FAMILY MEDICINE

## 2020-09-22 PROCEDURE — 1101F PT FALLS ASSESS-DOCD LE1/YR: CPT | Mod: S$GLB,,, | Performed by: FAMILY MEDICINE

## 2020-09-22 PROCEDURE — 3078F DIAST BP <80 MM HG: CPT | Mod: S$GLB,,, | Performed by: FAMILY MEDICINE

## 2020-09-22 PROCEDURE — 3074F SYST BP LT 130 MM HG: CPT | Mod: S$GLB,,, | Performed by: FAMILY MEDICINE

## 2020-09-22 PROCEDURE — 3074F PR MOST RECENT SYSTOLIC BLOOD PRESSURE < 130 MM HG: ICD-10-PCS | Mod: S$GLB,,, | Performed by: FAMILY MEDICINE

## 2020-09-22 PROCEDURE — 99214 PR OFFICE/OUTPT VISIT, EST, LEVL IV, 30-39 MIN: ICD-10-PCS | Mod: S$GLB,,, | Performed by: FAMILY MEDICINE

## 2020-09-22 PROCEDURE — 1159F PR MEDICATION LIST DOCUMENTED IN MEDICAL RECORD: ICD-10-PCS | Mod: S$GLB,,, | Performed by: FAMILY MEDICINE

## 2020-09-22 PROCEDURE — 1101F PR PT FALLS ASSESS DOC 0-1 FALLS W/OUT INJ PAST YR: ICD-10-PCS | Mod: S$GLB,,, | Performed by: FAMILY MEDICINE

## 2020-09-22 PROCEDURE — 3078F PR MOST RECENT DIASTOLIC BLOOD PRESSURE < 80 MM HG: ICD-10-PCS | Mod: S$GLB,,, | Performed by: FAMILY MEDICINE

## 2020-09-22 PROCEDURE — 99214 OFFICE O/P EST MOD 30 MIN: CPT | Mod: S$GLB,,, | Performed by: FAMILY MEDICINE

## 2020-09-22 NOTE — PROGRESS NOTES
SUBJECTIVE:    Patient ID: Bruno Manjarrez is a 85 y.o. male.    Chief Complaint: Follow-up (ER F/u // did not bring bottles, states he does not need refills ac)    This 85-year-old male with Parkinson's disease lost his balance in the bathroom 2 days ago fell striking his head on the cement floor  twice.  He was brought to the emergency room where complete evaluation including CT scan of the head, C-spine x-rays, chest x-rays were performed.  He had no intracerebral hemorrhage, and no fracture of the cervical vertebrae.  Since then his wife his been having difficulty getting him up and down out of bed with poor balance.  His legs will buckle out from under him.  He has 2 skin tears on his elbows that have been dressed by the wife at home.  He seems have a good appetite and can take his medications for Parkinson's well.  He stays in bed most of the day but has a very rudimentary mattress and she would like a gel overlay mattress for his hospital bed.  He has difficulties with his memory and many of the symptoms of Parkinson's disease.      Admission on 09/19/2020, Discharged on 09/19/2020   Component Date Value Ref Range Status    WBC 09/19/2020 8.30  3.90 - 12.70 K/uL Final    RBC 09/19/2020 4.37* 4.60 - 6.20 M/uL Final    Hemoglobin 09/19/2020 13.8* 14.0 - 18.0 g/dL Final    Hematocrit 09/19/2020 42.4  40.0 - 54.0 % Final    Mean Corpuscular Volume 09/19/2020 97  82 - 98 fL Final    Mean Corpuscular Hemoglobin 09/19/2020 31.6* 27.0 - 31.0 pg Final    Mean Corpuscular Hemoglobin Conc 09/19/2020 32.5  32.0 - 36.0 g/dL Final    RDW 09/19/2020 12.3  11.5 - 14.5 % Final    Platelets 09/19/2020 181  150 - 350 K/uL Final    MPV 09/19/2020 11.4  9.2 - 12.9 fL Final    Immature Granulocytes 09/19/2020 0.4  0.0 - 0.5 % Final    Gran # (ANC) 09/19/2020 6.4  1.8 - 7.7 K/uL Final    Immature Grans (Abs) 09/19/2020 0.03  0.00 - 0.04 K/uL Final    Lymph # 09/19/2020 1.0  1.0 - 4.8 K/uL Final    Mono # 09/19/2020  0.6  0.3 - 1.0 K/uL Final    Eos # 09/19/2020 0.3  0.0 - 0.5 K/uL Final    Baso # 09/19/2020 0.04  0.00 - 0.20 K/uL Final    nRBC 09/19/2020 0  0 /100 WBC Final    Gran% 09/19/2020 76.4* 38.0 - 73.0 % Final    Lymph% 09/19/2020 12.4* 18.0 - 48.0 % Final    Mono% 09/19/2020 7.0  4.0 - 15.0 % Final    Eosinophil% 09/19/2020 3.3  0.0 - 8.0 % Final    Basophil% 09/19/2020 0.5  0.0 - 1.9 % Final    Differential Method 09/19/2020 Automated   Final    Sodium 09/19/2020 143  136 - 145 mmol/L Final    Potassium 09/19/2020 4.8  3.5 - 5.1 mmol/L Final    Chloride 09/19/2020 104  95 - 110 mmol/L Final    CO2 09/19/2020 28  23 - 29 mmol/L Final    Glucose 09/19/2020 98  70 - 110 mg/dL Final    BUN, Bld 09/19/2020 24* 8 - 23 mg/dL Final    Creatinine 09/19/2020 1.3  0.5 - 1.4 mg/dL Final    Calcium 09/19/2020 9.3  8.7 - 10.5 mg/dL Final    Total Protein 09/19/2020 7.0  6.0 - 8.4 g/dL Final    Albumin 09/19/2020 4.0  3.5 - 5.2 g/dL Final    Total Bilirubin 09/19/2020 1.0  0.1 - 1.0 mg/dL Final    Alkaline Phosphatase 09/19/2020 80  55 - 135 U/L Final    AST 09/19/2020 21  10 - 40 U/L Final    ALT 09/19/2020 21  10 - 44 U/L Final    Anion Gap 09/19/2020 11  8 - 16 mmol/L Final    eGFR if African American 09/19/2020 57.5* >60 mL/min/1.73 m^2 Final    eGFR if non African American 09/19/2020 49.8* >60 mL/min/1.73 m^2 Final       Past Medical History:   Diagnosis Date    Cataract     Dementia     Depression      Social History     Socioeconomic History    Marital status:      Spouse name: Not on file    Number of children: Not on file    Years of education: Not on file    Highest education level: Not on file   Occupational History    Not on file   Social Needs    Financial resource strain: Not on file    Food insecurity     Worry: Not on file     Inability: Not on file    Transportation needs     Medical: Not on file     Non-medical: Not on file   Tobacco Use    Smoking status: Never Smoker     Smokeless tobacco: Never Used   Substance and Sexual Activity    Alcohol use: Not on file    Drug use: Not on file    Sexual activity: Not on file   Lifestyle    Physical activity     Days per week: Not on file     Minutes per session: Not on file    Stress: Not on file   Relationships    Social connections     Talks on phone: Not on file     Gets together: Not on file     Attends Scientologist service: Not on file     Active member of club or organization: Not on file     Attends meetings of clubs or organizations: Not on file     Relationship status: Not on file   Other Topics Concern    Not on file   Social History Narrative    Not on file     Past Surgical History:   Procedure Laterality Date    APPENDECTOMY      EYE SURGERY       Family History   Problem Relation Age of Onset    Depression Mother        Review of patient's allergies indicates:  No Known Allergies    Current Outpatient Medications:     amLODIPine (NORVASC) 5 MG tablet, Take 1 tablet (5 mg total) by mouth once daily., Disp: 90 tablet, Rfl: 1    aspirin (ECOTRIN) 81 MG EC tablet, Take 81 mg by mouth once daily., Disp: , Rfl:     azithromycin 1% (AZASITE) 1 % Drop, 1 drop 2 (two) times daily., Disp: , Rfl:     carbidopa-levodopa  mg (SINEMET)  mg per tablet, Take 1 tablet by mouth 3 (three) times daily., Disp: 270 tablet, Rfl: 1    donepeziL (ARICEPT) 10 MG tablet, Take 1 tablet (10 mg total) by mouth every evening., Disp: 90 tablet, Rfl: 3    escitalopram oxalate (LEXAPRO) 10 MG tablet, Take 1 tablet (10 mg total) by mouth once daily., Disp: 90 tablet, Rfl: 1    lovastatin (MEVACOR) 40 MG tablet, Take 1 tablet (40 mg total) by mouth every evening., Disp: 90 tablet, Rfl: 1    lutein 40 mg Cap, Take 1 capsule by mouth once daily., Disp: , Rfl:     multivitamin (ONE DAILY MULTIVITAMIN) per tablet, Take 1 tablet by mouth once daily., Disp: , Rfl:     Review of Systems       Objective:      Vitals:    09/22/20 1206   BP:  "(!) 90/48   Pulse: (!) 56   Temp: 98.2 °F (36.8 °C)   Weight: 57.2 kg (126 lb)   Height: 5' 7" (1.702 m)     Physical Exam  Vitals signs and nursing note reviewed.   Constitutional:       Appearance: He is well-developed.      Comments: Thin frail elderly male sitting in a wheelchair.   HENT:      Head: Normocephalic and atraumatic.      Right Ear: External ear normal.      Left Ear: External ear normal.      Nose: Nose normal.   Eyes:      Pupils: Pupils are equal, round, and reactive to light.   Neck:      Musculoskeletal: Normal range of motion and neck supple.      Thyroid: No thyromegaly.      Vascular: No carotid bruit.   Cardiovascular:      Rate and Rhythm: Normal rate and regular rhythm.      Heart sounds: Normal heart sounds. No murmur.   Pulmonary:      Effort: Pulmonary effort is normal.      Breath sounds: Normal breath sounds. No wheezing or rales.   Abdominal:      General: Bowel sounds are normal. There is no distension.      Palpations: Abdomen is soft.      Tenderness: There is no abdominal tenderness.   Musculoskeletal: Normal range of motion.         General: No tenderness or deformity.      Lumbar back: Normal. He exhibits no pain and no spasm.      Comments: Bends 90 degrees at  waist shoulders have good range of motion knees have abrasions but all also good range of motion.   Lymphadenopathy:      Cervical: No cervical adenopathy.   Skin:     General: Skin is warm and dry.      Findings: No rash.      Comments: Arm elbow has a mild skin tear that was trimmed here in the clinic of excess flap of skin.  Right elbow abrasion is small 1 x 1 cm and clean   Neurological:      Mental Status: He is alert and oriented to person, place, and time.      Cranial Nerves: No cranial nerve deficit.      Coordination: Coordination normal.      Comments: Patient has increased tone and cogwheeling of arms and legs.  He has poor balance, he has a festinating gait.  He does have significant arm tremors "   Psychiatric:         Behavior: Behavior normal.         Thought Content: Thought content normal.         Judgment: Judgment normal.           Assessment:       1. Fall, subsequent encounter    2. Parkinson's disease    3. Memory loss    4. Dementia without behavioral disturbance, unspecified dementia type    5. Depression, unspecified depression type    6. Chronic kidney disease, stage 3    7. Skin tear of upper extremity         Plan:       Fall, subsequent encounter  Patient had a significant fall with head trauma probably had a concussion.  Will decrease his aspirin to 81 mg Monday Wednesday Friday to reduce chance of bleeding.  Consult physical therapy with home health  Parkinson's disease  Continues to decline with Parkinson's disease, trial of physical therapy would be ideal  Memory loss    Dementia without behavioral disturbance, unspecified dementia type    Depression, unspecified depression type    Chronic kidney disease, stage 3    Skin tear of upper extremity  Local wound care with topical antibiotics and skin bandages would be sufficient.  We will get home health nursing to follow along    No follow-ups on file.        9/22/2020 Tyrell Daniel

## 2020-09-23 ENCOUNTER — TELEPHONE (OUTPATIENT)
Dept: FAMILY MEDICINE | Facility: CLINIC | Age: 85
End: 2020-09-23
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
## 2020-09-23 DIAGNOSIS — G20.A1 PARKINSON'S DISEASE: ICD-10-CM

## 2020-10-12 ENCOUNTER — DOCUMENT SCAN (OUTPATIENT)
Dept: HOME HEALTH SERVICES | Facility: HOSPITAL | Age: 85
End: 2020-10-12

## 2020-10-15 ENCOUNTER — DOCUMENT SCAN (OUTPATIENT)
Dept: HOME HEALTH SERVICES | Facility: HOSPITAL | Age: 85
End: 2020-10-15

## 2020-10-20 ENCOUNTER — DOCUMENT SCAN (OUTPATIENT)
Dept: HOME HEALTH SERVICES | Facility: HOSPITAL | Age: 85
End: 2020-10-20

## 2020-10-20 ENCOUNTER — EXTERNAL HOME HEALTH (OUTPATIENT)
Dept: HOME HEALTH SERVICES | Facility: HOSPITAL | Age: 85
End: 2020-10-20

## 2020-10-22 ENCOUNTER — DOCUMENT SCAN (OUTPATIENT)
Dept: HOME HEALTH SERVICES | Facility: HOSPITAL | Age: 85
End: 2020-10-22

## 2020-11-18 ENCOUNTER — TELEPHONE (OUTPATIENT)
Dept: FAMILY MEDICINE | Facility: CLINIC | Age: 85
End: 2020-11-18

## 2020-11-18 NOTE — TELEPHONE ENCOUNTER
----- Message from Torey Maier sent at 11/18/2020  9:48 AM CST -----  Regarding: Needs call back from nurse  Contact: Bruno Manjarrez  Pt  has been constipated since yesterday and it';s worse today . Wife needs something to clean the patient out. Please give Rebecca a call back # [phone]

## 2020-11-18 NOTE — TELEPHONE ENCOUNTER
"Per Dr. Daniel "1 bottle mag citrate" Pts wife said she was at the pharmacy and the pharmacist told her that could cause all kinds of sx in a patient his age so she didn't buy it. She's already given him OTC ducolax and a suppository. Dr. Daniel aware.   "

## 2020-12-01 ENCOUNTER — DOCUMENT SCAN (OUTPATIENT)
Dept: HOME HEALTH SERVICES | Facility: HOSPITAL | Age: 85
End: 2020-12-01

## 2021-01-11 ENCOUNTER — TELEPHONE (OUTPATIENT)
Dept: FAMILY MEDICINE | Facility: CLINIC | Age: 86
End: 2021-01-11
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
## 2021-01-11 DIAGNOSIS — R41.3 MEMORY LOSS: ICD-10-CM

## 2021-01-11 DIAGNOSIS — G20.A1 PARKINSON'S DISEASE: ICD-10-CM

## 2021-01-12 ENCOUNTER — TELEPHONE (OUTPATIENT)
Dept: FAMILY MEDICINE | Facility: CLINIC | Age: 86
End: 2021-01-12

## 2021-01-13 ENCOUNTER — TELEPHONE (OUTPATIENT)
Dept: FAMILY MEDICINE | Facility: CLINIC | Age: 86
End: 2021-01-13

## 2021-01-25 ENCOUNTER — TELEPHONE (OUTPATIENT)
Dept: FAMILY MEDICINE | Facility: CLINIC | Age: 86
End: 2021-01-25

## 2021-01-27 ENCOUNTER — TELEPHONE (OUTPATIENT)
Dept: FAMILY MEDICINE | Facility: CLINIC | Age: 86
End: 2021-01-27

## 2021-01-27 DIAGNOSIS — G20.A1 PARKINSON'S DISEASE: Primary | ICD-10-CM

## 2021-01-27 DIAGNOSIS — F03.90 DEMENTIA WITHOUT BEHAVIORAL DISTURBANCE, UNSPECIFIED DEMENTIA TYPE: ICD-10-CM

## 2021-02-01 ENCOUNTER — DOCUMENT SCAN (OUTPATIENT)
Dept: HOME HEALTH SERVICES | Facility: HOSPITAL | Age: 86
End: 2021-02-01

## 2021-02-01 ENCOUNTER — OFFICE VISIT (OUTPATIENT)
Dept: FAMILY MEDICINE | Facility: CLINIC | Age: 86
End: 2021-02-01
Payer: MEDICARE

## 2021-02-01 VITALS
BODY MASS INDEX: 20.4 KG/M2 | SYSTOLIC BLOOD PRESSURE: 120 MMHG | HEIGHT: 67 IN | WEIGHT: 130 LBS | DIASTOLIC BLOOD PRESSURE: 70 MMHG | HEART RATE: 60 BPM

## 2021-02-01 DIAGNOSIS — G20.A1 PARKINSON'S DISEASE: ICD-10-CM

## 2021-02-01 DIAGNOSIS — F32.A MILD DEPRESSION: ICD-10-CM

## 2021-02-01 DIAGNOSIS — E78.2 MIXED HYPERLIPIDEMIA: ICD-10-CM

## 2021-02-01 DIAGNOSIS — G31.83 LEWY BODY DEMENTIA WITHOUT BEHAVIORAL DISTURBANCE: Primary | ICD-10-CM

## 2021-02-01 DIAGNOSIS — I95.1 ORTHOSTATIC HYPOTENSION: ICD-10-CM

## 2021-02-01 DIAGNOSIS — F02.80 LEWY BODY DEMENTIA WITHOUT BEHAVIORAL DISTURBANCE: Primary | ICD-10-CM

## 2021-02-01 PROCEDURE — 1159F MED LIST DOCD IN RCRD: CPT | Mod: S$GLB,,, | Performed by: NURSE PRACTITIONER

## 2021-02-01 PROCEDURE — 1159F PR MEDICATION LIST DOCUMENTED IN MEDICAL RECORD: ICD-10-PCS | Mod: S$GLB,,, | Performed by: NURSE PRACTITIONER

## 2021-02-01 PROCEDURE — 99214 OFFICE O/P EST MOD 30 MIN: CPT | Mod: S$GLB,,, | Performed by: NURSE PRACTITIONER

## 2021-02-01 PROCEDURE — 99214 PR OFFICE/OUTPT VISIT, EST, LEVL IV, 30-39 MIN: ICD-10-PCS | Mod: S$GLB,,, | Performed by: NURSE PRACTITIONER

## 2021-02-01 RX ORDER — LOVASTATIN 40 MG/1
40 TABLET ORAL NIGHTLY
Qty: 90 TABLET | Refills: 1 | Status: SHIPPED | OUTPATIENT
Start: 2021-02-01 | End: 2021-03-12 | Stop reason: SDUPTHER

## 2021-02-01 RX ORDER — ESCITALOPRAM OXALATE 10 MG/1
10 TABLET ORAL DAILY
Qty: 90 TABLET | Refills: 1 | Status: ON HOLD | OUTPATIENT
Start: 2021-02-01 | End: 2021-08-23 | Stop reason: HOSPADM

## 2021-02-01 RX ORDER — MIDODRINE HYDROCHLORIDE 2.5 MG/1
2.5 TABLET ORAL 2 TIMES DAILY WITH MEALS
Qty: 60 TABLET | Refills: 2 | Status: SHIPPED | OUTPATIENT
Start: 2021-02-01 | End: 2021-03-12

## 2021-02-01 RX ORDER — CARBIDOPA AND LEVODOPA 25; 100 MG/1; MG/1
1 TABLET ORAL 3 TIMES DAILY
Qty: 270 TABLET | Refills: 1 | Status: ON HOLD | OUTPATIENT
Start: 2021-02-01 | End: 2021-08-23 | Stop reason: HOSPADM

## 2021-02-11 ENCOUNTER — EXTERNAL HOME HEALTH (OUTPATIENT)
Dept: HOME HEALTH SERVICES | Facility: HOSPITAL | Age: 86
End: 2021-02-11

## 2021-02-12 ENCOUNTER — DOCUMENT SCAN (OUTPATIENT)
Dept: HOME HEALTH SERVICES | Facility: HOSPITAL | Age: 86
End: 2021-02-12

## 2021-02-26 LAB
ALBUMIN SERPL-MCNC: 4.2 G/DL (ref 3.6–5.1)
ALBUMIN/GLOB SERPL: 1.7 (CALC) (ref 1–2.5)
ALP SERPL-CCNC: 68 U/L (ref 35–144)
ALT SERPL-CCNC: 31 U/L (ref 9–46)
AST SERPL-CCNC: 23 U/L (ref 10–35)
BASOPHILS # BLD AUTO: 50 CELLS/UL (ref 0–200)
BASOPHILS NFR BLD AUTO: 0.8 %
BILIRUB SERPL-MCNC: 0.7 MG/DL (ref 0.2–1.2)
BUN SERPL-MCNC: 25 MG/DL (ref 7–25)
BUN/CREAT SERPL: 18 (CALC) (ref 6–22)
CALCIUM SERPL-MCNC: 9.6 MG/DL (ref 8.6–10.3)
CHLORIDE SERPL-SCNC: 105 MMOL/L (ref 98–110)
CHOLEST SERPL-MCNC: 212 MG/DL
CHOLEST/HDLC SERPL: 2.9 (CALC)
CO2 SERPL-SCNC: 31 MMOL/L (ref 20–32)
CREAT SERPL-MCNC: 1.36 MG/DL (ref 0.7–1.11)
EOSINOPHIL # BLD AUTO: 273 CELLS/UL (ref 15–500)
EOSINOPHIL NFR BLD AUTO: 4.4 %
ERYTHROCYTE [DISTWIDTH] IN BLOOD BY AUTOMATED COUNT: 12.6 % (ref 11–15)
GFRSERPLBLD MDRD-ARVRAT: 47 ML/MIN/1.73M2
GLOBULIN SER CALC-MCNC: 2.5 G/DL (CALC) (ref 1.9–3.7)
GLUCOSE SERPL-MCNC: 99 MG/DL (ref 65–99)
HCT VFR BLD AUTO: 41.3 % (ref 38.5–50)
HDLC SERPL-MCNC: 72 MG/DL
HGB BLD-MCNC: 14.1 G/DL (ref 13.2–17.1)
LDLC SERPL CALC-MCNC: 115 MG/DL (CALC)
LYMPHOCYTES # BLD AUTO: 1494 CELLS/UL (ref 850–3900)
LYMPHOCYTES NFR BLD AUTO: 24.1 %
MCH RBC QN AUTO: 33.3 PG (ref 27–33)
MCHC RBC AUTO-ENTMCNC: 34.1 G/DL (ref 32–36)
MCV RBC AUTO: 97.4 FL (ref 80–100)
MONOCYTES # BLD AUTO: 422 CELLS/UL (ref 200–950)
MONOCYTES NFR BLD AUTO: 6.8 %
NEUTROPHILS # BLD AUTO: 3962 CELLS/UL (ref 1500–7800)
NEUTROPHILS NFR BLD AUTO: 63.9 %
NONHDLC SERPL-MCNC: 140 MG/DL (CALC)
PLATELET # BLD AUTO: 205 THOUSAND/UL (ref 140–400)
PMV BLD REES-ECKER: 10.7 FL (ref 7.5–12.5)
POTASSIUM SERPL-SCNC: 4.9 MMOL/L (ref 3.5–5.3)
PROT SERPL-MCNC: 6.7 G/DL (ref 6.1–8.1)
PSA SERPL-MCNC: 2.1 NG/ML
RBC # BLD AUTO: 4.24 MILLION/UL (ref 4.2–5.8)
SODIUM SERPL-SCNC: 143 MMOL/L (ref 135–146)
TRIGL SERPL-MCNC: 137 MG/DL
WBC # BLD AUTO: 6.2 THOUSAND/UL (ref 3.8–10.8)

## 2021-03-03 ENCOUNTER — TELEPHONE (OUTPATIENT)
Dept: FAMILY MEDICINE | Facility: CLINIC | Age: 86
End: 2021-03-03

## 2021-03-11 ENCOUNTER — DOCUMENT SCAN (OUTPATIENT)
Dept: HOME HEALTH SERVICES | Facility: HOSPITAL | Age: 86
End: 2021-03-11

## 2021-03-12 ENCOUNTER — OFFICE VISIT (OUTPATIENT)
Dept: FAMILY MEDICINE | Facility: CLINIC | Age: 86
End: 2021-03-12
Payer: MEDICARE

## 2021-03-12 VITALS
BODY MASS INDEX: 20.72 KG/M2 | HEIGHT: 67 IN | SYSTOLIC BLOOD PRESSURE: 138 MMHG | HEART RATE: 60 BPM | WEIGHT: 132 LBS | DIASTOLIC BLOOD PRESSURE: 82 MMHG

## 2021-03-12 DIAGNOSIS — F02.80 DEMENTIA DUE TO PARKINSON'S DISEASE WITHOUT BEHAVIORAL DISTURBANCE: ICD-10-CM

## 2021-03-12 DIAGNOSIS — F32.A DEPRESSION, UNSPECIFIED DEPRESSION TYPE: ICD-10-CM
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
## 2021-03-12 DIAGNOSIS — N18.31 STAGE 3A CHRONIC KIDNEY DISEASE: ICD-10-CM

## 2021-03-12 DIAGNOSIS — G47.52 REM SLEEP BEHAVIOR DISORDER: ICD-10-CM

## 2021-03-12 DIAGNOSIS — G11.9 HEREDITARY ATAXIA: ICD-10-CM

## 2021-03-12 DIAGNOSIS — E78.2 MIXED HYPERLIPIDEMIA: ICD-10-CM

## 2021-03-12 DIAGNOSIS — I10 ESSENTIAL HYPERTENSION: ICD-10-CM

## 2021-03-12 DIAGNOSIS — R41.3 MEMORY LOSS: ICD-10-CM

## 2021-03-12 DIAGNOSIS — H90.0 CONDUCTIVE HEARING LOSS, BILATERAL: ICD-10-CM

## 2021-03-12 DIAGNOSIS — G20.A1 PARKINSON'S DISEASE: Primary | ICD-10-CM

## 2021-03-12 DIAGNOSIS — G20.A1 DEMENTIA DUE TO PARKINSON'S DISEASE WITHOUT BEHAVIORAL DISTURBANCE: ICD-10-CM

## 2021-03-12 PROCEDURE — 99214 OFFICE O/P EST MOD 30 MIN: CPT | Mod: S$GLB,,, | Performed by: FAMILY MEDICINE

## 2021-03-12 PROCEDURE — 1159F PR MEDICATION LIST DOCUMENTED IN MEDICAL RECORD: ICD-10-PCS | Mod: S$GLB,,, | Performed by: FAMILY MEDICINE

## 2021-03-12 PROCEDURE — 99214 PR OFFICE/OUTPT VISIT, EST, LEVL IV, 30-39 MIN: ICD-10-PCS | Mod: S$GLB,,, | Performed by: FAMILY MEDICINE

## 2021-03-12 PROCEDURE — 1159F MED LIST DOCD IN RCRD: CPT | Mod: S$GLB,,, | Performed by: FAMILY MEDICINE

## 2021-03-12 RX ORDER — LOVASTATIN 40 MG/1
40 TABLET ORAL NIGHTLY
Qty: 90 TABLET | Refills: 1 | Status: ON HOLD | OUTPATIENT
Start: 2021-03-12 | End: 2021-08-23 | Stop reason: HOSPADM

## 2021-03-19 ENCOUNTER — IMMUNIZATION (OUTPATIENT)
Dept: FAMILY MEDICINE | Facility: CLINIC | Age: 86
End: 2021-03-19
Payer: MEDICARE

## 2021-03-19 ENCOUNTER — DOCUMENT SCAN (OUTPATIENT)
Dept: HOME HEALTH SERVICES | Facility: HOSPITAL | Age: 86
End: 2021-03-19

## 2021-03-19 DIAGNOSIS — Z23 NEED FOR VACCINATION: Primary | ICD-10-CM

## 2021-03-19 PROCEDURE — 0001A COVID-19, MRNA, LNP-S, PF, 30 MCG/0.3 ML DOSE VACCINE: CPT | Mod: CV19,S$GLB,, | Performed by: FAMILY MEDICINE

## 2021-03-19 PROCEDURE — 91300 COVID-19, MRNA, LNP-S, PF, 30 MCG/0.3 ML DOSE VACCINE: ICD-10-PCS | Mod: S$GLB,,, | Performed by: FAMILY MEDICINE

## 2021-03-19 PROCEDURE — 91300 COVID-19, MRNA, LNP-S, PF, 30 MCG/0.3 ML DOSE VACCINE: CPT | Mod: S$GLB,,, | Performed by: FAMILY MEDICINE

## 2021-03-19 PROCEDURE — 0001A COVID-19, MRNA, LNP-S, PF, 30 MCG/0.3 ML DOSE VACCINE: ICD-10-PCS | Mod: CV19,S$GLB,, | Performed by: FAMILY MEDICINE

## 2021-04-09 ENCOUNTER — IMMUNIZATION (OUTPATIENT)
Dept: FAMILY MEDICINE | Facility: CLINIC | Age: 86
End: 2021-04-09
Payer: MEDICARE

## 2021-04-09 DIAGNOSIS — Z23 NEED FOR VACCINATION: Primary | ICD-10-CM

## 2021-04-09 PROCEDURE — 91300 COVID-19, MRNA, LNP-S, PF, 30 MCG/0.3 ML DOSE VACCINE: CPT | Mod: S$GLB,,, | Performed by: FAMILY MEDICINE

## 2021-04-09 PROCEDURE — 0002A COVID-19, MRNA, LNP-S, PF, 30 MCG/0.3 ML DOSE VACCINE: ICD-10-PCS | Mod: CV19,S$GLB,, | Performed by: FAMILY MEDICINE

## 2021-04-09 PROCEDURE — 0002A COVID-19, MRNA, LNP-S, PF, 30 MCG/0.3 ML DOSE VACCINE: CPT | Mod: CV19,S$GLB,, | Performed by: FAMILY MEDICINE

## 2021-04-09 PROCEDURE — 91300 COVID-19, MRNA, LNP-S, PF, 30 MCG/0.3 ML DOSE VACCINE: ICD-10-PCS | Mod: S$GLB,,, | Performed by: FAMILY MEDICINE

## 2021-06-07 ENCOUNTER — TELEPHONE (OUTPATIENT)
Dept: FAMILY MEDICINE | Facility: CLINIC | Age: 86
End: 2021-06-07

## 2021-06-08 ENCOUNTER — HOSPITAL ENCOUNTER (OUTPATIENT)
Dept: RADIOLOGY | Facility: HOSPITAL | Age: 86
Discharge: HOME | End: 2021-06-08
Attending: NURSE PRACTITIONER
Payer: MEDICARE

## 2021-06-08 ENCOUNTER — TELEPHONE (OUTPATIENT)
Dept: FAMILY MEDICINE | Facility: CLINIC | Age: 86
End: 2021-06-08

## 2021-06-08 ENCOUNTER — OFFICE VISIT (OUTPATIENT)
Dept: FAMILY MEDICINE | Facility: CLINIC | Age: 86
End: 2021-06-08
Payer: MEDICARE

## 2021-06-08 VITALS
SYSTOLIC BLOOD PRESSURE: 116 MMHG | OXYGEN SATURATION: 97 % | WEIGHT: 136 LBS | HEART RATE: 82 BPM | BODY MASS INDEX: 21.35 KG/M2 | HEIGHT: 67 IN | DIASTOLIC BLOOD PRESSURE: 72 MMHG

## 2021-06-08 DIAGNOSIS — M25.551 BILATERAL HIP PAIN: ICD-10-CM

## 2021-06-08 DIAGNOSIS — R29.6 RECURRENT FALLS: ICD-10-CM

## 2021-06-08 DIAGNOSIS — G20.A1 PARKINSON'S DISEASE: Primary | ICD-10-CM

## 2021-06-08 DIAGNOSIS — M25.552 BILATERAL HIP PAIN: ICD-10-CM

## 2021-06-08 DIAGNOSIS — F02.80 DEMENTIA DUE TO PARKINSON'S DISEASE WITHOUT BEHAVIORAL DISTURBANCE: ICD-10-CM

## 2021-06-08 DIAGNOSIS — G20.A1 DEMENTIA DUE TO PARKINSON'S DISEASE WITHOUT BEHAVIORAL DISTURBANCE: ICD-10-CM

## 2021-06-08 PROCEDURE — 99214 OFFICE O/P EST MOD 30 MIN: CPT | Mod: S$GLB,,, | Performed by: NURSE PRACTITIONER

## 2021-06-08 PROCEDURE — 3288F PR FALLS RISK ASSESSMENT DOCUMENTED: ICD-10-PCS | Mod: S$GLB,,, | Performed by: NURSE PRACTITIONER

## 2021-06-08 PROCEDURE — 1100F PR PT FALLS ASSESS DOC 2+ FALLS/FALL W/INJURY/YR: ICD-10-PCS | Mod: S$GLB,,, | Performed by: NURSE PRACTITIONER

## 2021-06-08 PROCEDURE — 1159F PR MEDICATION LIST DOCUMENTED IN MEDICAL RECORD: ICD-10-PCS | Mod: S$GLB,,, | Performed by: NURSE PRACTITIONER

## 2021-06-08 PROCEDURE — 3288F FALL RISK ASSESSMENT DOCD: CPT | Mod: S$GLB,,, | Performed by: NURSE PRACTITIONER

## 2021-06-08 PROCEDURE — 73521 X-RAY EXAM HIPS BI 2 VIEWS: CPT | Mod: TC,PO

## 2021-06-08 PROCEDURE — 1159F MED LIST DOCD IN RCRD: CPT | Mod: S$GLB,,, | Performed by: NURSE PRACTITIONER

## 2021-06-08 PROCEDURE — 1100F PTFALLS ASSESS-DOCD GE2>/YR: CPT | Mod: S$GLB,,, | Performed by: NURSE PRACTITIONER

## 2021-06-08 PROCEDURE — 99214 PR OFFICE/OUTPT VISIT, EST, LEVL IV, 30-39 MIN: ICD-10-PCS | Mod: S$GLB,,, | Performed by: NURSE PRACTITIONER

## 2021-06-17 ENCOUNTER — TELEPHONE (OUTPATIENT)
Dept: FAMILY MEDICINE | Facility: CLINIC | Age: 86
End: 2021-06-17

## 2021-06-17 DIAGNOSIS — R29.6 RECURRENT FALLS: ICD-10-CM

## 2021-06-17 DIAGNOSIS — G20.A1 PARKINSON'S DISEASE: Primary | ICD-10-CM

## 2021-06-23 ENCOUNTER — DOCUMENT SCAN (OUTPATIENT)
Dept: HOME HEALTH SERVICES | Facility: HOSPITAL | Age: 86
End: 2021-06-23

## 2021-06-28 ENCOUNTER — EXTERNAL HOME HEALTH (OUTPATIENT)
Dept: HOME HEALTH SERVICES | Facility: HOSPITAL | Age: 86
End: 2021-06-28

## 2021-08-03 ENCOUNTER — DOCUMENT SCAN (OUTPATIENT)
Dept: HOME HEALTH SERVICES | Facility: HOSPITAL | Age: 86
End: 2021-08-03

## 2021-08-19 ENCOUNTER — HOSPITAL ENCOUNTER (EMERGENCY)
Facility: HOSPITAL | Age: 86
Discharge: HOME | End: 2021-08-19
Attending: EMERGENCY MEDICINE
Payer: MEDICARE

## 2021-08-19 VITALS
SYSTOLIC BLOOD PRESSURE: 152 MMHG | DIASTOLIC BLOOD PRESSURE: 70 MMHG | OXYGEN SATURATION: 96 % | HEART RATE: 63 BPM | TEMPERATURE: 98 F | HEIGHT: 67 IN | RESPIRATION RATE: 18 BRPM | BODY MASS INDEX: 21.97 KG/M2 | WEIGHT: 140 LBS

## 2021-08-19 DIAGNOSIS — H10.32 ACUTE BACTERIAL CONJUNCTIVITIS OF LEFT EYE: ICD-10-CM

## 2021-08-19 DIAGNOSIS — G20.A1 PARKINSON'S DISEASE: Primary | ICD-10-CM

## 2021-08-19 DIAGNOSIS — F02.80 DEMENTIA DUE TO PARKINSON'S DISEASE WITHOUT BEHAVIORAL DISTURBANCE: ICD-10-CM

## 2021-08-19 DIAGNOSIS — N18.30 STAGE 3 CHRONIC KIDNEY DISEASE, UNSPECIFIED WHETHER STAGE 3A OR 3B CKD: ICD-10-CM

## 2021-08-19 DIAGNOSIS — G20.A1 DEMENTIA DUE TO PARKINSON'S DISEASE WITHOUT BEHAVIORAL DISTURBANCE: ICD-10-CM

## 2021-08-19 DIAGNOSIS — R05.9 COUGH: ICD-10-CM

## 2021-08-19 DIAGNOSIS — R53.1 WEAKNESS: ICD-10-CM

## 2021-08-19 LAB
ALBUMIN SERPL BCP-MCNC: 4.4 G/DL (ref 3.5–5.2)
ALP SERPL-CCNC: 72 U/L (ref 55–135)
ALT SERPL W/O P-5'-P-CCNC: 10 U/L (ref 10–44)
ANION GAP SERPL CALC-SCNC: 10 MMOL/L (ref 8–16)
AST SERPL-CCNC: 24 U/L (ref 10–40)
BASOPHILS # BLD AUTO: 0.04 K/UL (ref 0–0.2)
BASOPHILS NFR BLD: 0.6 % (ref 0–1.9)
BILIRUB SERPL-MCNC: 1 MG/DL (ref 0.1–1)
BILIRUB UR QL STRIP: NEGATIVE
BNP SERPL-MCNC: 93 PG/ML (ref 0–99)
BUN SERPL-MCNC: 24 MG/DL (ref 8–23)
CALCIUM SERPL-MCNC: 9.3 MG/DL (ref 8.7–10.5)
CHLORIDE SERPL-SCNC: 103 MMOL/L (ref 95–110)
CLARITY UR: CLEAR
CO2 SERPL-SCNC: 27 MMOL/L (ref 23–29)
COLOR UR: YELLOW
CREAT SERPL-MCNC: 1.5 MG/DL (ref 0.5–1.4)
CREAT SERPL-MCNC: 1.6 MG/DL (ref 0.5–1.4)
DIFFERENTIAL METHOD: ABNORMAL
EOSINOPHIL # BLD AUTO: 0.1 K/UL (ref 0–0.5)
EOSINOPHIL NFR BLD: 1.6 % (ref 0–8)
ERYTHROCYTE [DISTWIDTH] IN BLOOD BY AUTOMATED COUNT: 11.9 % (ref 11.5–14.5)
EST. GFR  (AFRICAN AMERICAN): 48 ML/MIN/1.73 M^2
EST. GFR  (NON AFRICAN AMERICAN): 41.6 ML/MIN/1.73 M^2
GLUCOSE SERPL-MCNC: 92 MG/DL (ref 70–110)
GLUCOSE SERPL-MCNC: 93 MG/DL (ref 70–110)
GLUCOSE UR QL STRIP: NEGATIVE
HCT VFR BLD AUTO: 46.9 % (ref 40–54)
HGB BLD-MCNC: 15.4 G/DL (ref 14–18)
HGB UR QL STRIP: NEGATIVE
IMM GRANULOCYTES # BLD AUTO: 0.02 K/UL (ref 0–0.04)
IMM GRANULOCYTES NFR BLD AUTO: 0.3 % (ref 0–0.5)
KETONES UR QL STRIP: ABNORMAL
LACTATE SERPL-SCNC: 1.2 MMOL/L (ref 0.5–1.9)
LEUKOCYTE ESTERASE UR QL STRIP: NEGATIVE
LIPASE SERPL-CCNC: 32 U/L (ref 4–60)
LYMPHOCYTES # BLD AUTO: 0.9 K/UL (ref 1–4.8)
LYMPHOCYTES NFR BLD: 13.3 % (ref 18–48)
MAGNESIUM SERPL-MCNC: 2.3 MG/DL (ref 1.6–2.6)
MCH RBC QN AUTO: 31.5 PG (ref 27–31)
MCHC RBC AUTO-ENTMCNC: 32.8 G/DL (ref 32–36)
MCV RBC AUTO: 96 FL (ref 82–98)
MONOCYTES # BLD AUTO: 0.5 K/UL (ref 0.3–1)
MONOCYTES NFR BLD: 6.6 % (ref 4–15)
NEUTROPHILS # BLD AUTO: 5.3 K/UL (ref 1.8–7.7)
NEUTROPHILS NFR BLD: 77.6 % (ref 38–73)
NITRITE UR QL STRIP: NEGATIVE
NRBC BLD-RTO: 0 /100 WBC
PH UR STRIP: 6 [PH] (ref 5–8)
PLATELET # BLD AUTO: 202 K/UL (ref 150–450)
PMV BLD AUTO: 11.4 FL (ref 9.2–12.9)
POTASSIUM SERPL-SCNC: 4.9 MMOL/L (ref 3.5–5.1)
PROT SERPL-MCNC: 7.7 G/DL (ref 6–8.4)
PROT UR QL STRIP: NEGATIVE
RBC # BLD AUTO: 4.89 M/UL (ref 4.6–6.2)
SAMPLE: ABNORMAL
SARS-COV-2 RDRP RESP QL NAA+PROBE: NEGATIVE
SODIUM SERPL-SCNC: 140 MMOL/L (ref 136–145)
SP GR UR STRIP: 1.01 (ref 1–1.03)
TROPONIN I SERPL DL<=0.01 NG/ML-MCNC: 0.03 NG/ML
TSH SERPL DL<=0.005 MIU/L-ACNC: 2.09 UIU/ML (ref 0.34–5.6)
URN SPEC COLLECT METH UR: ABNORMAL
UROBILINOGEN UR STRIP-ACNC: NEGATIVE EU/DL
WBC # BLD AUTO: 6.86 K/UL (ref 3.9–12.7)

## 2021-08-19 PROCEDURE — 93010 EKG 12-LEAD: ICD-10-PCS | Mod: ,,, | Performed by: SPECIALIST

## 2021-08-19 PROCEDURE — 80053 COMPREHEN METABOLIC PANEL: CPT | Performed by: EMERGENCY MEDICINE

## 2021-08-19 PROCEDURE — 81003 URINALYSIS AUTO W/O SCOPE: CPT | Performed by: EMERGENCY MEDICINE

## 2021-08-19 PROCEDURE — 83880 ASSAY OF NATRIURETIC PEPTIDE: CPT | Performed by: EMERGENCY MEDICINE

## 2021-08-19 PROCEDURE — 93005 ELECTROCARDIOGRAM TRACING: CPT | Performed by: SPECIALIST

## 2021-08-19 PROCEDURE — 99285 EMERGENCY DEPT VISIT HI MDM: CPT

## 2021-08-19 PROCEDURE — 82962 GLUCOSE BLOOD TEST: CPT

## 2021-08-19 PROCEDURE — 84484 ASSAY OF TROPONIN QUANT: CPT | Performed by: EMERGENCY MEDICINE

## 2021-08-19 PROCEDURE — 84443 ASSAY THYROID STIM HORMONE: CPT | Performed by: EMERGENCY MEDICINE

## 2021-08-19 PROCEDURE — U0002 COVID-19 LAB TEST NON-CDC: HCPCS | Performed by: EMERGENCY MEDICINE

## 2021-08-19 PROCEDURE — 85025 COMPLETE CBC W/AUTO DIFF WBC: CPT | Performed by: EMERGENCY MEDICINE

## 2021-08-19 PROCEDURE — 83735 ASSAY OF MAGNESIUM: CPT | Performed by: EMERGENCY MEDICINE

## 2021-08-19 PROCEDURE — 83690 ASSAY OF LIPASE: CPT | Performed by: EMERGENCY MEDICINE

## 2021-08-19 PROCEDURE — 93010 ELECTROCARDIOGRAM REPORT: CPT | Mod: ,,, | Performed by: SPECIALIST

## 2021-08-19 PROCEDURE — 83605 ASSAY OF LACTIC ACID: CPT | Performed by: EMERGENCY MEDICINE

## 2021-08-19 RX ORDER — FEXOFENADINE HCL 60 MG
1 TABLET ORAL DAILY
Status: ON HOLD | COMMUNITY
Start: 2012-04-03 | End: 2021-08-23 | Stop reason: HOSPADM

## 2021-08-19 RX ORDER — ERYTHROMYCIN 5 MG/G
OINTMENT OPHTHALMIC
Qty: 1 TUBE | Refills: 0 | Status: ON HOLD | OUTPATIENT
Start: 2021-08-19 | End: 2021-08-23 | Stop reason: HOSPADM

## 2021-08-20 ENCOUNTER — TELEPHONE (OUTPATIENT)
Dept: FAMILY MEDICINE | Facility: CLINIC | Age: 86
End: 2021-08-20

## 2021-08-20 DIAGNOSIS — G20.A1 PARKINSON'S DISEASE: ICD-10-CM

## 2021-08-20 DIAGNOSIS — R41.3 MEMORY LOSS: ICD-10-CM

## 2021-08-20 DIAGNOSIS — R56.9 SEIZURES: Primary | ICD-10-CM

## 2021-08-20 DIAGNOSIS — R29.6 RECURRENT FALLS: ICD-10-CM

## 2021-08-21 ENCOUNTER — HOSPITAL ENCOUNTER (OUTPATIENT)
Facility: HOSPITAL | Age: 86
Discharge: HOSPICE | End: 2021-08-24
Attending: EMERGENCY MEDICINE | Admitting: STUDENT IN AN ORGANIZED HEALTH CARE EDUCATION/TRAINING PROGRAM
Payer: MEDICARE

## 2021-08-21 DIAGNOSIS — G20.A1 DEMENTIA DUE TO PARKINSON'S DISEASE WITH BEHAVIORAL DISTURBANCE: ICD-10-CM

## 2021-08-21 DIAGNOSIS — G20.A1 DEMENTIA DUE TO PARKINSON'S DISEASE WITHOUT BEHAVIORAL DISTURBANCE: Primary | ICD-10-CM

## 2021-08-21 DIAGNOSIS — F02.818 DEMENTIA DUE TO PARKINSON'S DISEASE WITH BEHAVIORAL DISTURBANCE: ICD-10-CM

## 2021-08-21 DIAGNOSIS — T17.908A ASPIRATION INTO AIRWAY: ICD-10-CM

## 2021-08-21 DIAGNOSIS — F02.80 DEMENTIA DUE TO PARKINSON'S DISEASE WITHOUT BEHAVIORAL DISTURBANCE: Primary | ICD-10-CM

## 2021-08-21 DIAGNOSIS — G20.A1 PARKINSON'S DISEASE: Primary | ICD-10-CM

## 2021-08-21 DIAGNOSIS — R40.0 DROWSY: ICD-10-CM

## 2021-08-21 DIAGNOSIS — E86.0 DEHYDRATION: ICD-10-CM

## 2021-08-21 LAB
BACTERIA #/AREA URNS HPF: NEGATIVE /HPF
BASOPHILS # BLD AUTO: 0.04 K/UL (ref 0–0.2)
BASOPHILS NFR BLD: 0.3 % (ref 0–1.9)
BILIRUB UR QL STRIP: NEGATIVE
CLARITY UR: ABNORMAL
COLOR UR: YELLOW
DIFFERENTIAL METHOD: ABNORMAL
EOSINOPHIL # BLD AUTO: 0.1 K/UL (ref 0–0.5)
EOSINOPHIL NFR BLD: 0.6 % (ref 0–8)
ERYTHROCYTE [DISTWIDTH] IN BLOOD BY AUTOMATED COUNT: 12.4 % (ref 11.5–14.5)
GLUCOSE UR QL STRIP: NEGATIVE
HCT VFR BLD AUTO: 43.6 % (ref 40–54)
HGB BLD-MCNC: 14.3 G/DL (ref 14–18)
HGB UR QL STRIP: ABNORMAL
HYALINE CASTS #/AREA URNS LPF: 27 /LPF
IMM GRANULOCYTES # BLD AUTO: 0.04 K/UL (ref 0–0.04)
IMM GRANULOCYTES NFR BLD AUTO: 0.3 % (ref 0–0.5)
KETONES UR QL STRIP: ABNORMAL
LEUKOCYTE ESTERASE UR QL STRIP: NEGATIVE
LYMPHOCYTES # BLD AUTO: 0.9 K/UL (ref 1–4.8)
LYMPHOCYTES NFR BLD: 7.4 % (ref 18–48)
MCH RBC QN AUTO: 31.7 PG (ref 27–31)
MCHC RBC AUTO-ENTMCNC: 32.8 G/DL (ref 32–36)
MCV RBC AUTO: 97 FL (ref 82–98)
MICROSCOPIC COMMENT: ABNORMAL
MONOCYTES # BLD AUTO: 0.9 K/UL (ref 0.3–1)
MONOCYTES NFR BLD: 7.4 % (ref 4–15)
NEUTROPHILS # BLD AUTO: 9.7 K/UL (ref 1.8–7.7)
NEUTROPHILS NFR BLD: 84 % (ref 38–73)
NITRITE UR QL STRIP: NEGATIVE
NRBC BLD-RTO: 0 /100 WBC
PH UR STRIP: 5 [PH] (ref 5–8)
PLATELET # BLD AUTO: 176 K/UL (ref 150–450)
PMV BLD AUTO: 11.6 FL (ref 9.2–12.9)
PROT UR QL STRIP: ABNORMAL
RBC # BLD AUTO: 4.51 M/UL (ref 4.6–6.2)
RBC #/AREA URNS HPF: 17 /HPF (ref 0–4)
SP GR UR STRIP: 1.02 (ref 1–1.03)
SQUAMOUS #/AREA URNS HPF: 22 /HPF
URN SPEC COLLECT METH UR: ABNORMAL
UROBILINOGEN UR STRIP-ACNC: NEGATIVE EU/DL
WBC # BLD AUTO: 11.56 K/UL (ref 3.9–12.7)
WBC #/AREA URNS HPF: 18 /HPF (ref 0–5)

## 2021-08-21 PROCEDURE — 93005 ELECTROCARDIOGRAM TRACING: CPT | Performed by: SPECIALIST

## 2021-08-21 PROCEDURE — 93010 ELECTROCARDIOGRAM REPORT: CPT | Mod: ,,, | Performed by: SPECIALIST

## 2021-08-21 PROCEDURE — 81001 URINALYSIS AUTO W/SCOPE: CPT | Performed by: STUDENT IN AN ORGANIZED HEALTH CARE EDUCATION/TRAINING PROGRAM

## 2021-08-21 PROCEDURE — 80053 COMPREHEN METABOLIC PANEL: CPT | Performed by: STUDENT IN AN ORGANIZED HEALTH CARE EDUCATION/TRAINING PROGRAM

## 2021-08-21 PROCEDURE — G0378 HOSPITAL OBSERVATION PER HR: HCPCS

## 2021-08-21 PROCEDURE — 87086 URINE CULTURE/COLONY COUNT: CPT | Performed by: STUDENT IN AN ORGANIZED HEALTH CARE EDUCATION/TRAINING PROGRAM

## 2021-08-21 PROCEDURE — 85025 COMPLETE CBC W/AUTO DIFF WBC: CPT | Performed by: STUDENT IN AN ORGANIZED HEALTH CARE EDUCATION/TRAINING PROGRAM

## 2021-08-21 PROCEDURE — 99285 EMERGENCY DEPT VISIT HI MDM: CPT

## 2021-08-21 PROCEDURE — 93010 EKG 12-LEAD: ICD-10-PCS | Mod: ,,, | Performed by: SPECIALIST

## 2021-08-21 RX ORDER — LORAZEPAM 2 MG/ML
1 INJECTION INTRAMUSCULAR EVERY 6 HOURS PRN
Status: DISCONTINUED | OUTPATIENT
Start: 2021-08-21 | End: 2021-08-24 | Stop reason: HOSPADM

## 2021-08-21 RX ORDER — LORAZEPAM 0.5 MG/1
TABLET ORAL
Qty: 50 TABLET | Refills: 0 | Status: SHIPPED | OUTPATIENT
Start: 2021-08-21

## 2021-08-21 RX ORDER — LORAZEPAM 2 MG/ML
2 INJECTION INTRAMUSCULAR
Status: DISCONTINUED | OUTPATIENT
Start: 2021-08-21 | End: 2021-08-24 | Stop reason: HOSPADM

## 2021-08-21 RX ORDER — SODIUM CHLORIDE 0.9 % (FLUSH) 0.9 %
10 SYRINGE (ML) INJECTION
Status: DISCONTINUED | OUTPATIENT
Start: 2021-08-21 | End: 2021-08-24 | Stop reason: HOSPADM

## 2021-08-21 RX ORDER — ONDANSETRON 2 MG/ML
4 INJECTION INTRAMUSCULAR; INTRAVENOUS EVERY 8 HOURS PRN
Status: DISCONTINUED | OUTPATIENT
Start: 2021-08-21 | End: 2021-08-24 | Stop reason: HOSPADM

## 2021-08-21 RX ORDER — SODIUM CHLORIDE 9 MG/ML
INJECTION, SOLUTION INTRAVENOUS CONTINUOUS
Status: DISCONTINUED | OUTPATIENT
Start: 2021-08-21 | End: 2021-08-24 | Stop reason: HOSPADM

## 2021-08-21 RX ORDER — SCOLOPAMINE TRANSDERMAL SYSTEM 1 MG/1
1 PATCH, EXTENDED RELEASE TRANSDERMAL
Status: DISCONTINUED | OUTPATIENT
Start: 2021-08-21 | End: 2021-08-24 | Stop reason: HOSPADM

## 2021-08-21 RX ORDER — ASPIRIN 81 MG/1
81 TABLET ORAL DAILY
Status: ON HOLD | COMMUNITY
End: 2021-08-23 | Stop reason: HOSPADM

## 2021-08-21 RX ORDER — MORPHINE SULFATE 2 MG/ML
2 INJECTION, SOLUTION INTRAMUSCULAR; INTRAVENOUS EVERY 4 HOURS PRN
Status: DISCONTINUED | OUTPATIENT
Start: 2021-08-21 | End: 2021-08-24 | Stop reason: HOSPADM

## 2021-08-22 PROBLEM — F03.918 DEMENTIA WITH BEHAVIORAL DISTURBANCE: Status: ACTIVE | Noted: 2019-09-10

## 2021-08-22 PROCEDURE — 99900031 HC PATIENT EDUCATION (STAT)

## 2021-08-22 PROCEDURE — G0378 HOSPITAL OBSERVATION PER HR: HCPCS

## 2021-08-22 PROCEDURE — 94761 N-INVAS EAR/PLS OXIMETRY MLT: CPT

## 2021-08-22 PROCEDURE — 99900035 HC TECH TIME PER 15 MIN (STAT)

## 2021-08-22 PROCEDURE — 27000221 HC OXYGEN, UP TO 24 HOURS

## 2021-08-22 PROCEDURE — 25000003 PHARM REV CODE 250: Performed by: STUDENT IN AN ORGANIZED HEALTH CARE EDUCATION/TRAINING PROGRAM

## 2021-08-22 PROCEDURE — 31720 CLEARANCE OF AIRWAYS: CPT

## 2021-08-22 PROCEDURE — 63600175 PHARM REV CODE 636 W HCPCS: Performed by: STUDENT IN AN ORGANIZED HEALTH CARE EDUCATION/TRAINING PROGRAM

## 2021-08-22 PROCEDURE — 96374 THER/PROPH/DIAG INJ IV PUSH: CPT | Mod: 59

## 2021-08-22 RX ORDER — ACETAMINOPHEN 650 MG/1
650 SUPPOSITORY RECTAL EVERY 8 HOURS PRN
Status: DISCONTINUED | OUTPATIENT
Start: 2021-08-22 | End: 2021-08-24 | Stop reason: HOSPADM

## 2021-08-22 RX ADMIN — SCOPOLAMINE 1 PATCH: 1 PATCH TRANSDERMAL at 01:08

## 2021-08-22 RX ADMIN — SODIUM CHLORIDE: 0.9 INJECTION, SOLUTION INTRAVENOUS at 01:08

## 2021-08-22 RX ADMIN — LORAZEPAM 2 MG: 2 INJECTION INTRAMUSCULAR; INTRAVENOUS at 10:08

## 2021-08-22 RX ADMIN — MORPHINE SULFATE 2 MG: 2 INJECTION, SOLUTION INTRAMUSCULAR; INTRAVENOUS at 10:08

## 2021-08-22 RX ADMIN — ACETAMINOPHEN 650 MG: 650 SUPPOSITORY RECTAL at 08:08

## 2021-08-23 VITALS
HEART RATE: 62 BPM | SYSTOLIC BLOOD PRESSURE: 131 MMHG | BODY MASS INDEX: 21.97 KG/M2 | HEIGHT: 67 IN | OXYGEN SATURATION: 96 % | DIASTOLIC BLOOD PRESSURE: 51 MMHG | RESPIRATION RATE: 18 BRPM | TEMPERATURE: 99 F | WEIGHT: 140 LBS

## 2021-08-23 PROCEDURE — 63600175 PHARM REV CODE 636 W HCPCS: Performed by: STUDENT IN AN ORGANIZED HEALTH CARE EDUCATION/TRAINING PROGRAM

## 2021-08-23 PROCEDURE — 27000221 HC OXYGEN, UP TO 24 HOURS

## 2021-08-23 PROCEDURE — 94761 N-INVAS EAR/PLS OXIMETRY MLT: CPT

## 2021-08-23 PROCEDURE — G0378 HOSPITAL OBSERVATION PER HR: HCPCS

## 2021-08-23 PROCEDURE — 96375 TX/PRO/DX INJ NEW DRUG ADDON: CPT

## 2021-08-23 RX ADMIN — MORPHINE SULFATE 2 MG: 2 INJECTION, SOLUTION INTRAMUSCULAR; INTRAVENOUS at 08:08

## 2021-08-23 RX ADMIN — LORAZEPAM 2 MG: 2 INJECTION INTRAMUSCULAR; INTRAVENOUS at 08:08

## 2021-08-24 LAB — BACTERIA UR CULT: NO GROWTH

## 2021-08-24 PROCEDURE — 96376 TX/PRO/DX INJ SAME DRUG ADON: CPT

## 2021-08-24 PROCEDURE — G0378 HOSPITAL OBSERVATION PER HR: HCPCS

## 2021-09-13 ENCOUNTER — TELEPHONE (OUTPATIENT)
Dept: FAMILY MEDICINE | Facility: CLINIC | Age: 86
End: 2021-09-13

## 2021-09-16 ENCOUNTER — DOCUMENT SCAN (OUTPATIENT)
Dept: HOME HEALTH SERVICES | Facility: HOSPITAL | Age: 86
End: 2021-09-16

## 2022-12-12 NOTE — TELEPHONE ENCOUNTER
----- Message from Crow Sanches sent at 8/18/2020 11:13 AM CDT -----  Regarding: refills  Donepezil   Pharm [facility]   Pt [phone]     
[FreeTextEntry1] : Recommend supportive care including antipyretics, fluids, and nasal saline followed by nasal suction. Return if symptoms worsen or persist.\par RVP DONE